# Patient Record
Sex: FEMALE | Race: WHITE | NOT HISPANIC OR LATINO | URBAN - METROPOLITAN AREA
[De-identification: names, ages, dates, MRNs, and addresses within clinical notes are randomized per-mention and may not be internally consistent; named-entity substitution may affect disease eponyms.]

---

## 2019-01-26 ENCOUNTER — OUTPATIENT (OUTPATIENT)
Dept: OUTPATIENT SERVICES | Facility: HOSPITAL | Age: 84
LOS: 1 days | Discharge: HOME | End: 2019-01-26

## 2019-01-26 DIAGNOSIS — E03.9 HYPOTHYROIDISM, UNSPECIFIED: ICD-10-CM

## 2019-01-26 DIAGNOSIS — R53.83 OTHER FATIGUE: ICD-10-CM

## 2019-01-26 DIAGNOSIS — E78.5 HYPERLIPIDEMIA, UNSPECIFIED: ICD-10-CM

## 2019-01-26 DIAGNOSIS — K76.9 LIVER DISEASE, UNSPECIFIED: ICD-10-CM

## 2019-01-26 DIAGNOSIS — I10 ESSENTIAL (PRIMARY) HYPERTENSION: ICD-10-CM

## 2019-01-26 DIAGNOSIS — N39.0 URINARY TRACT INFECTION, SITE NOT SPECIFIED: ICD-10-CM

## 2019-01-26 DIAGNOSIS — E13.9 OTHER SPECIFIED DIABETES MELLITUS WITHOUT COMPLICATIONS: ICD-10-CM

## 2019-01-26 DIAGNOSIS — E55.9 VITAMIN D DEFICIENCY, UNSPECIFIED: ICD-10-CM

## 2019-03-06 ENCOUNTER — EMERGENCY (EMERGENCY)
Facility: HOSPITAL | Age: 84
LOS: 0 days | Discharge: HOME | End: 2019-03-06
Attending: EMERGENCY MEDICINE | Admitting: EMERGENCY MEDICINE

## 2019-03-06 VITALS
HEART RATE: 56 BPM | OXYGEN SATURATION: 98 % | SYSTOLIC BLOOD PRESSURE: 133 MMHG | RESPIRATION RATE: 17 BRPM | DIASTOLIC BLOOD PRESSURE: 65 MMHG

## 2019-03-06 VITALS
HEART RATE: 49 BPM | SYSTOLIC BLOOD PRESSURE: 136 MMHG | WEIGHT: 106.92 LBS | DIASTOLIC BLOOD PRESSURE: 67 MMHG | OXYGEN SATURATION: 97 % | TEMPERATURE: 96 F | RESPIRATION RATE: 18 BRPM

## 2019-03-06 DIAGNOSIS — Z98.890 OTHER SPECIFIED POSTPROCEDURAL STATES: ICD-10-CM

## 2019-03-06 DIAGNOSIS — I10 ESSENTIAL (PRIMARY) HYPERTENSION: ICD-10-CM

## 2019-03-06 DIAGNOSIS — R06.02 SHORTNESS OF BREATH: ICD-10-CM

## 2019-03-06 DIAGNOSIS — Z79.899 OTHER LONG TERM (CURRENT) DRUG THERAPY: ICD-10-CM

## 2019-03-06 DIAGNOSIS — Z98.890 OTHER SPECIFIED POSTPROCEDURAL STATES: Chronic | ICD-10-CM
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
## 2019-03-06 DIAGNOSIS — R42 DIZZINESS AND GIDDINESS: ICD-10-CM
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
## 2019-03-06 DIAGNOSIS — R07.81 PLEURODYNIA: ICD-10-CM
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
## 2019-03-06 DIAGNOSIS — E11.9 TYPE 2 DIABETES MELLITUS WITHOUT COMPLICATIONS: ICD-10-CM

## 2019-03-06 RX ORDER — ACETAMINOPHEN 500 MG
650 TABLET ORAL ONCE
Qty: 0 | Refills: 0 | Status: COMPLETED | OUTPATIENT
Start: 2019-03-06 | End: 2019-03-06

## 2019-03-06 RX ADMIN — Medication 650 MILLIGRAM(S): at 14:32

## 2019-03-06 NOTE — ED PROVIDER NOTE - NSFOLLOWUPINSTRUCTIONS_ED_ALL_ED_FT
Contusion    A contusion is a deep bruise. Contusions are the result of a blunt injury to tissues and muscle fibers under the skin. The skin overlying the contusion may turn blue, purple, or yellow. Symptoms also include pain and swelling in the injured area. Symptoms should resolve with time.     SEEK IMMEDIATE MEDICAL CARE IF YOU HAVE THE FOLLOWING SYMPTOMS: severe pain, numbness, tingling, pain, weakness, or skin color/temperature change in any part of your body distal to the fracture.    Patient to be discharged from ED. Any available test results were discussed with patient and/or family. Verbal instructions given, including instructions to return to ED immediately for any new, worsening, or concerning symptoms. Patient endorsed understanding. Written discharge instructions additionally given, including follow-up plan.

## 2019-03-06 NOTE — ED PROVIDER NOTE - OBJECTIVE STATEMENT
hx from patient and daughter  89 yo F c/o right  ribs pains x 3 days. Per daughter patient was choking on wine Monday night so she did the Heimlich maneuver on her mom. Since then patient has had right rib pains with movement and inspiration. Pain is intermittent and moderate in severity.

## 2019-03-06 NOTE — ED PROVIDER NOTE - NS ED ROS FT
Review of Systems    Constitutional: (-) fever, (-) chills  Cardiovascular: (-) chest pain, (-) syncope  Respiratory: (-) cough, (-) shortness of breath  Gastrointestinal: (-) pain, (-) nausea, (-) vomiting, (-) diarrhea  Musculoskeletal: (-) neck pain, (-) back pain, (+) right rib pain  Integumentary: (-) rash, (-) edema  Neurological: (-) headache, (-) altered mental status

## 2019-03-06 NOTE — ED ADULT NURSE NOTE - NSIMPLEMENTINTERV_GEN_ALL_ED
Implemented All Universal Safety Interventions:  [place] to call system. Call bell, personal items and telephone within reach. Instruct patient to call for assistance. Room bathroom lighting operational. Non-slip footwear when patient is off stretcher. Physically safe environment: no spills, clutter or unnecessary equipment. Stretcher in lowest position, wheels locked, appropriate side rails in place.

## 2019-03-06 NOTE — ED ADULT TRIAGE NOTE - CHIEF COMPLAINT QUOTE
daughter states choking on wine last night so I gave Heimlich and im afraid I broke a rib she is short of breath and has history of pneumothorax".

## 2019-03-06 NOTE — ED PROVIDER NOTE - ATTENDING CONTRIBUTION TO CARE
89yo F presents with SOB and dizziness that began on Monday. pt states that on Monday she choked and was given the Heimlich Maneuver but now is also c/o pain to the right ribs. Pt denies any CP, nausea, vomiting, abdominal pain, back pain. On exam: NCAT. PERRLA, EOMI. OP clear. Lungs CTAB. RRR, S1S2 noted. Radial pulses 2+ and equal, pedal pulses 2+ and equal. Abdomen soft, NT/ND, no rebound or guarding. FROM x4 extremities. No focal neuro deficits. Plan: CT, CXR and reassess

## 2019-03-06 NOTE — ED PROVIDER NOTE - PROGRESS NOTE DETAILS
89yo F presents with SOB and dizziness that began on Monday. pt states that on Monday she choked and was given the Heimlich Maneuver but now is also c/o pain to the right ribs. Pt denies any CP, nausea, vomiting, abdominal pain, back pain. On exam: NCAT. PERRLA, EOMI. OP clear. Lungs CTAB. RRR, S1S2 noted. Radial pulses 2+ and equal, pedal pulses 2+ and equal. Abdomen soft, NT/ND, no rebound or guarding. FROM x4 extremities. No focal neuro deficits. Plan: CT, CXR and reassess Patient given copies of results, advised f/u with pmd.

## 2019-03-06 NOTE — ED ADULT NURSE NOTE - PMH
Diverticulosis    DM (diabetes mellitus)    Glaucoma    HTN (hypertension)    Macular degeneration, age related    Pneumothorax

## 2019-03-06 NOTE — ED PROVIDER NOTE - PHYSICAL EXAMINATION
Gen: Alert, NAD, well appearing  Head: NC, AT, PERRL, EOMI, normal lids/conjunctiva  Neck: +supple, no tenderness/meningismus,  Pulm: Bilateral BS, normal resp effort, no wheeze/stridor/retractions  CV: S1S2, aleksandra  Abd: soft, NT/ND  Mskel: + tender to palpation right  lower lateral ribs. No swelling, ecchymosis, or crepitus. No edema/erythema/cyanosis  Skin: no rash, warm/dry  Neuro: AAOx3, no sensory/motor deficits

## 2019-04-20 ENCOUNTER — INPATIENT (INPATIENT)
Facility: HOSPITAL | Age: 84
LOS: 2 days | Discharge: HOME HEALTH | End: 2019-04-23
Attending: INTERNAL MEDICINE | Admitting: INTERNAL MEDICINE
Payer: MEDICARE

## 2019-04-20 VITALS
DIASTOLIC BLOOD PRESSURE: 63 MMHG | SYSTOLIC BLOOD PRESSURE: 145 MMHG | TEMPERATURE: 98 F | RESPIRATION RATE: 22 BRPM | HEART RATE: 66 BPM | OXYGEN SATURATION: 96 %

## 2019-04-20 DIAGNOSIS — Z98.890 OTHER SPECIFIED POSTPROCEDURAL STATES: Chronic | ICD-10-CM

## 2019-04-20 PROBLEM — H40.9 UNSPECIFIED GLAUCOMA: Chronic | Status: ACTIVE | Noted: 2019-03-06

## 2019-04-20 PROBLEM — E11.9 TYPE 2 DIABETES MELLITUS WITHOUT COMPLICATIONS: Chronic | Status: ACTIVE | Noted: 2019-03-06

## 2019-04-20 PROBLEM — H35.30 UNSPECIFIED MACULAR DEGENERATION: Chronic | Status: ACTIVE | Noted: 2019-03-06

## 2019-04-20 PROBLEM — I10 ESSENTIAL (PRIMARY) HYPERTENSION: Chronic | Status: ACTIVE | Noted: 2019-03-06

## 2019-04-20 PROBLEM — K57.90 DIVERTICULOSIS OF INTESTINE, PART UNSPECIFIED, WITHOUT PERFORATION OR ABSCESS WITHOUT BLEEDING: Chronic | Status: ACTIVE | Noted: 2019-03-06

## 2019-04-20 PROBLEM — J93.9 PNEUMOTHORAX, UNSPECIFIED: Chronic | Status: ACTIVE | Noted: 2019-03-06

## 2019-04-20 LAB
ALBUMIN SERPL ELPH-MCNC: 3.8 G/DL — SIGNIFICANT CHANGE UP (ref 3.5–5.2)
ALP SERPL-CCNC: 266 U/L — HIGH (ref 30–115)
ALT FLD-CCNC: 26 U/L — SIGNIFICANT CHANGE UP (ref 0–41)
ANION GAP SERPL CALC-SCNC: 13 MMOL/L — SIGNIFICANT CHANGE UP (ref 7–14)
AST SERPL-CCNC: 24 U/L — SIGNIFICANT CHANGE UP (ref 0–41)
BASOPHILS # BLD AUTO: 0.06 K/UL — SIGNIFICANT CHANGE UP (ref 0–0.2)
BASOPHILS NFR BLD AUTO: 0.4 % — SIGNIFICANT CHANGE UP (ref 0–1)
BILIRUB SERPL-MCNC: 0.9 MG/DL — SIGNIFICANT CHANGE UP (ref 0.2–1.2)
BUN SERPL-MCNC: 16 MG/DL — SIGNIFICANT CHANGE UP (ref 10–20)
CALCIUM SERPL-MCNC: 9.2 MG/DL — SIGNIFICANT CHANGE UP (ref 8.5–10.1)
CHLORIDE SERPL-SCNC: 101 MMOL/L — SIGNIFICANT CHANGE UP (ref 98–110)
CO2 SERPL-SCNC: 25 MMOL/L — SIGNIFICANT CHANGE UP (ref 17–32)
CREAT SERPL-MCNC: 1 MG/DL — SIGNIFICANT CHANGE UP (ref 0.7–1.5)
EOSINOPHIL # BLD AUTO: 0.12 K/UL — SIGNIFICANT CHANGE UP (ref 0–0.7)
EOSINOPHIL NFR BLD AUTO: 0.8 % — SIGNIFICANT CHANGE UP (ref 0–8)
GLUCOSE BLDC GLUCOMTR-MCNC: 102 MG/DL — HIGH (ref 70–99)
GLUCOSE BLDC GLUCOMTR-MCNC: 131 MG/DL — HIGH (ref 70–99)
GLUCOSE BLDC GLUCOMTR-MCNC: 181 MG/DL — HIGH (ref 70–99)
GLUCOSE BLDC GLUCOMTR-MCNC: 26 MG/DL — CRITICAL LOW (ref 70–99)
GLUCOSE BLDC GLUCOMTR-MCNC: 279 MG/DL — HIGH (ref 70–99)
GLUCOSE BLDC GLUCOMTR-MCNC: 285 MG/DL — HIGH (ref 70–99)
GLUCOSE BLDC GLUCOMTR-MCNC: 31 MG/DL — CRITICAL LOW (ref 70–99)
GLUCOSE BLDC GLUCOMTR-MCNC: 87 MG/DL — SIGNIFICANT CHANGE UP (ref 70–99)
GLUCOSE SERPL-MCNC: 256 MG/DL — HIGH (ref 70–99)
HCT VFR BLD CALC: 43.5 % — SIGNIFICANT CHANGE UP (ref 37–47)
HGB BLD-MCNC: 14.2 G/DL — SIGNIFICANT CHANGE UP (ref 12–16)
IMM GRANULOCYTES NFR BLD AUTO: 0.7 % — HIGH (ref 0.1–0.3)
LACTATE SERPL-SCNC: 1 MMOL/L — SIGNIFICANT CHANGE UP (ref 0.5–2.2)
LYMPHOCYTES # BLD AUTO: 1.03 K/UL — LOW (ref 1.2–3.4)
LYMPHOCYTES # BLD AUTO: 7.3 % — LOW (ref 20.5–51.1)
MCHC RBC-ENTMCNC: 28.7 PG — SIGNIFICANT CHANGE UP (ref 27–31)
MCHC RBC-ENTMCNC: 32.6 G/DL — SIGNIFICANT CHANGE UP (ref 32–37)
MCV RBC AUTO: 88.1 FL — SIGNIFICANT CHANGE UP (ref 81–99)
MONOCYTES # BLD AUTO: 0.5 K/UL — SIGNIFICANT CHANGE UP (ref 0.1–0.6)
MONOCYTES NFR BLD AUTO: 3.5 % — SIGNIFICANT CHANGE UP (ref 1.7–9.3)
NEUTROPHILS # BLD AUTO: 12.31 K/UL — HIGH (ref 1.4–6.5)
NEUTROPHILS NFR BLD AUTO: 87.3 % — HIGH (ref 42.2–75.2)
NRBC # BLD: 0 /100 WBCS — SIGNIFICANT CHANGE UP (ref 0–0)
NT-PROBNP SERPL-SCNC: 1079 PG/ML — HIGH (ref 0–300)
PLATELET # BLD AUTO: 261 K/UL — SIGNIFICANT CHANGE UP (ref 130–400)
POTASSIUM SERPL-MCNC: 4.1 MMOL/L — SIGNIFICANT CHANGE UP (ref 3.5–5)
POTASSIUM SERPL-SCNC: 4.1 MMOL/L — SIGNIFICANT CHANGE UP (ref 3.5–5)
PROT SERPL-MCNC: 7.4 G/DL — SIGNIFICANT CHANGE UP (ref 6–8)
RBC # BLD: 4.94 M/UL — SIGNIFICANT CHANGE UP (ref 4.2–5.4)
RBC # FLD: 14.9 % — HIGH (ref 11.5–14.5)
SODIUM SERPL-SCNC: 139 MMOL/L — SIGNIFICANT CHANGE UP (ref 135–146)
TROPONIN T SERPL-MCNC: <0.01 NG/ML — SIGNIFICANT CHANGE UP
WBC # BLD: 14.12 K/UL — HIGH (ref 4.8–10.8)
WBC # FLD AUTO: 14.12 K/UL — HIGH (ref 4.8–10.8)

## 2019-04-20 PROCEDURE — 71045 X-RAY EXAM CHEST 1 VIEW: CPT | Mod: 26

## 2019-04-20 PROCEDURE — 99285 EMERGENCY DEPT VISIT HI MDM: CPT

## 2019-04-20 PROCEDURE — 93010 ELECTROCARDIOGRAM REPORT: CPT

## 2019-04-20 RX ORDER — DEXTROSE 50 % IN WATER 50 %
25 SYRINGE (ML) INTRAVENOUS ONCE
Qty: 0 | Refills: 0 | Status: COMPLETED | OUTPATIENT
Start: 2019-04-20 | End: 2019-04-20

## 2019-04-20 RX ORDER — FUROSEMIDE 40 MG
40 TABLET ORAL DAILY
Qty: 0 | Refills: 0 | Status: DISCONTINUED | OUTPATIENT
Start: 2019-04-20 | End: 2019-04-23

## 2019-04-20 RX ORDER — LISINOPRIL 2.5 MG/1
10 TABLET ORAL DAILY
Qty: 0 | Refills: 0 | Status: DISCONTINUED | OUTPATIENT
Start: 2019-04-20 | End: 2019-04-23

## 2019-04-20 RX ORDER — SODIUM CHLORIDE 9 MG/ML
1000 INJECTION, SOLUTION INTRAVENOUS
Qty: 0 | Refills: 0 | Status: DISCONTINUED | OUTPATIENT
Start: 2019-04-20 | End: 2019-04-23

## 2019-04-20 RX ORDER — DEXTROSE 50 % IN WATER 50 %
12.5 SYRINGE (ML) INTRAVENOUS ONCE
Qty: 0 | Refills: 0 | Status: DISCONTINUED | OUTPATIENT
Start: 2019-04-20 | End: 2019-04-23

## 2019-04-20 RX ORDER — DEXTROSE 50 % IN WATER 50 %
25 SYRINGE (ML) INTRAVENOUS ONCE
Qty: 0 | Refills: 0 | Status: DISCONTINUED | OUTPATIENT
Start: 2019-04-20 | End: 2019-04-23

## 2019-04-20 RX ORDER — POTASSIUM CHLORIDE 20 MEQ
20 PACKET (EA) ORAL DAILY
Qty: 0 | Refills: 0 | Status: DISCONTINUED | OUTPATIENT
Start: 2019-04-20 | End: 2019-04-23

## 2019-04-20 RX ORDER — INSULIN LISPRO 100/ML
VIAL (ML) SUBCUTANEOUS
Qty: 0 | Refills: 0 | Status: DISCONTINUED | OUTPATIENT
Start: 2019-04-20 | End: 2019-04-20

## 2019-04-20 RX ORDER — CHLORHEXIDINE GLUCONATE 213 G/1000ML
1 SOLUTION TOPICAL
Qty: 0 | Refills: 0 | Status: DISCONTINUED | OUTPATIENT
Start: 2019-04-20 | End: 2019-04-23

## 2019-04-20 RX ORDER — AMLODIPINE BESYLATE 2.5 MG/1
5 TABLET ORAL DAILY
Qty: 0 | Refills: 0 | Status: DISCONTINUED | OUTPATIENT
Start: 2019-04-20 | End: 2019-04-23

## 2019-04-20 RX ORDER — PROPRANOLOL HCL 160 MG
40 CAPSULE, EXTENDED RELEASE 24HR ORAL
Qty: 0 | Refills: 0 | Status: DISCONTINUED | OUTPATIENT
Start: 2019-04-20 | End: 2019-04-22

## 2019-04-20 RX ORDER — INSULIN GLARGINE 100 [IU]/ML
15 INJECTION, SOLUTION SUBCUTANEOUS EVERY MORNING
Qty: 0 | Refills: 0 | Status: DISCONTINUED | OUTPATIENT
Start: 2019-04-20 | End: 2019-04-20

## 2019-04-20 RX ORDER — ENOXAPARIN SODIUM 100 MG/ML
40 INJECTION SUBCUTANEOUS AT BEDTIME
Qty: 0 | Refills: 0 | Status: DISCONTINUED | OUTPATIENT
Start: 2019-04-20 | End: 2019-04-23

## 2019-04-20 RX ORDER — GLUCAGON INJECTION, SOLUTION 0.5 MG/.1ML
1 INJECTION, SOLUTION SUBCUTANEOUS ONCE
Qty: 0 | Refills: 0 | Status: DISCONTINUED | OUTPATIENT
Start: 2019-04-20 | End: 2019-04-23

## 2019-04-20 RX ORDER — INSULIN LISPRO 100/ML
5 VIAL (ML) SUBCUTANEOUS
Qty: 0 | Refills: 0 | Status: DISCONTINUED | OUTPATIENT
Start: 2019-04-20 | End: 2019-04-20

## 2019-04-20 RX ORDER — DEXTROSE 50 % IN WATER 50 %
15 SYRINGE (ML) INTRAVENOUS ONCE
Qty: 0 | Refills: 0 | Status: DISCONTINUED | OUTPATIENT
Start: 2019-04-20 | End: 2019-04-23

## 2019-04-20 RX ADMIN — Medication 5 UNIT(S): at 17:11

## 2019-04-20 RX ADMIN — Medication 25 GRAM(S): at 19:15

## 2019-04-20 RX ADMIN — ENOXAPARIN SODIUM 40 MILLIGRAM(S): 100 INJECTION SUBCUTANEOUS at 21:07

## 2019-04-20 RX ADMIN — INSULIN GLARGINE 15 UNIT(S): 100 INJECTION, SOLUTION SUBCUTANEOUS at 13:08

## 2019-04-20 RX ADMIN — Medication 40 MILLIGRAM(S): at 17:11

## 2019-04-20 RX ADMIN — Medication 6: at 17:11

## 2019-04-20 NOTE — H&P ADULT - ATTENDING COMMENTS
The patient was seen and examined at bedside independently.  Agree with above a/p by resident except the portion edited/deleted.    Would d0 d/c planning in 24/48 hrs,

## 2019-04-20 NOTE — H&P ADULT - ASSESSMENT
89 y.o. f w/ PMHx of DM, Hypothyroidism, DLD presents with a chief complaint of shortness of breath that has been progressively worsening for the past month.     #) BHATIA  -doubt CHF  -possible COPD/ Asthma, pulm HTN?  -BNP- 1079, but w/ age expect BNP>1800 and CXR does not reflect volume overload  -2D Echo ordered  -c/w home dose lasix  -CE neg x1  -CXR in am ordered  -EKG in am ordered    #) Hypothyroidism  -c/w home meds    #) DLD  -c/w home meds    #) DM  -basal/ preprandial insulin regimen started    #) DVT/ GI ppx  -lovenox, no GI ppx indicated    #) Code Status  -full code    #) Dispo  -from home  -Physiatry/ PT eval pending 89 y.o. f w/ PMHx of DM, Hypothyroidism, DLD presents with a chief complaint of shortness of breath that has been progressively worsening for the past month.     #) BHATIA  -doubt CHF  -possible COPD/ Asthma, pulm HTN?  -BNP- 1079, but w/ age expect BNP>1800 and CXR does not reflect volume overload  -2D Echo ordered  -c/w home dose lasix  -CE neg x1  -CXR in am ordered  -EKG in am ordered    #) Hypothyroidism  -c/w home meds    #) DLD  -c/w home meds    #) DM  -basal/ preprandial insulin regimen started    #) DVT/ GI ppx  -lovenox, no GI ppx indicated    #) Code Status  -DNR/ DNI    #) Dispo  -from home  -Physiatry/ PT eval pending 89 y.o. f w/ PMHx of DM, HTN, OA presents with a chief complaint of shortness of breath on exertion that has been progressively worsening for the past week.    #) BHATIA of unknown origin  -doubt CHF  -possible Asthma (never smoker), pulm HTN?  -BNP- 1079, but w/ age expect BNP>1800 and CXR does not reflect volume overload  -2D Echo ordered  -c/w home dose lasix  -CE neg x1  -CXR in am ordered  -EKG in am ordered    #) HTN  -c/w home meds (amlodipine/ benazipril)    #) DM  -basal/ preprandial insulin regimen started    #) DVT/ GI ppx  -lovenox, no GI ppx indicated    #) Code Status  -DNR/ DNI    #) Dispo  -from home, lives w/ daughter, ambulates independently  -Physiatry/ PT eval pending 89 y.o. f w/ PMHx of DM, HTN, OA presents with a chief complaint of shortness of breath on exertion that has been progressively worsening for the past week.    #) BHATIA of unknown origin  -doubt CHF  -possible Asthma (never smoker), pulm HTN?  -BNP- 1079, but w/ age expect BNP>1800 and CXR does not reflect volume overload  -2D Echo  r/o valvular dz.  -c/w home dose lasix  -CE neg x1  -CXR >>NAPD  -EKG     #) HTN  -c/w home meds (amlodipine/ benazipril)    #) DM  -basal/ preprandial insulin regimen started    #) DVT/ GI ppx  -lovenox, no GI ppx indicated    #) Code Status  -DNR/ DNI    #) Dispo  -from home, lives w/ daughter, ambulates independently  -Physiatry/ PT eval pending

## 2019-04-20 NOTE — ED ADULT NURSE NOTE - NSIMPLEMENTINTERV_GEN_ALL_ED
Implemented All Fall with Harm Risk Interventions:  [place] to call system. Call bell, personal items and telephone within reach. Instruct patient to call for assistance. Room bathroom lighting operational. Non-slip footwear when patient is off stretcher. Physically safe environment: no spills, clutter or unnecessary equipment. Stretcher in lowest position, wheels locked, appropriate side rails in place. Provide visual cue, wrist band, yellow gown, etc. Monitor gait and stability. Monitor for mental status changes and reorient to person, place, and time. Review medications for side effects contributing to fall risk. Reinforce activity limits and safety measures with patient and family. Provide visual clues: red socks.

## 2019-04-20 NOTE — H&P ADULT - NSICDXPASTMEDICALHX_GEN_ALL_CORE_FT
PAST MEDICAL HISTORY:  Diverticulosis     DM (diabetes mellitus)     Glaucoma     HTN (hypertension)     Macular degeneration, age related     Pneumothorax

## 2019-04-20 NOTE — ED PROVIDER NOTE - CLINICAL SUMMARY MEDICAL DECISION MAKING FREE TEXT BOX
Pt w/ SOB. + elevated BNP. Family concerned b/c pt cannot ambulate well in house w/ SOB. Fall risk. No cardiologist. Will admit for SOB, concern for fluid overload state, for echo, specialist consult, close reassessment for response to tx.

## 2019-04-20 NOTE — H&P ADULT - NSHPLABSRESULTS_GEN_ALL_CORE
14.2   14.12 )-----------( 261      ( 20 Apr 2019 08:39 )             43.5       04-20    139  |  101  |  16  ----------------------------<  256<H>  4.1   |  25  |  1.0    Ca    9.2      20 Apr 2019 08:39    TPro  7.4  /  Alb  3.8  /  TBili  0.9  /  DBili  x   /  AST  24  /  ALT  26  /  AlkPhos  266<H>  04-20      LIVER FUNCTIONS - ( 20 Apr 2019 08:39 )  Alb: 3.8 g/dL / Pro: 7.4 g/dL / ALK PHOS: 266 U/L / ALT: 26 U/L / AST: 24 U/L / GGT: x                 CARDIAC MARKERS ( 20 Apr 2019 08:39 )  x     / <0.01 ng/mL / x     / x     / x

## 2019-04-20 NOTE — ED ADULT NURSE NOTE - OBJECTIVE STATEMENT
Pt c/o worsening shortness of breath with exertion over the past week. Denies fevers, chills, chest pain, nausea, vomiting, cough, abd pain, back pain, or urinary symptoms.

## 2019-04-20 NOTE — ED PROVIDER NOTE - PHYSICAL EXAMINATION
CONSTITUTIONAL: NAD  SKIN: Warm dry  HEAD: NCAT  EYES: NL inspection  ENT: MMM  NECK: Supple; non tender.  CARD: RRR  RESP: fine bibasilar rales, some sob w/ lying flat  ABD: S/NT no R/G  EXT: 1+ b/l pitting edema  NEURO: Grossly unremarkable  PSYCH: Cooperative, appropriate.

## 2019-04-20 NOTE — ED PROVIDER NOTE - NS ED ROS FT
Constitutional:  No fever  Eyes:  No visual changes  ENMT: No neck pain or stiffness  Cardiac:  No chest pain  Respiratory:  See HPI  GI:  No nausea, vomiting, diarrhea or abdominal pain.  :  No dysuria, frequency or burning.  MS:  No back pain.  Neuro:  No headache   Skin:  No skin rash  Except as documented in the HPI,  all other systems are negative

## 2019-04-20 NOTE — ED ADULT TRIAGE NOTE - CHIEF COMPLAINT QUOTE
patient Shortness of breath x 1 month. as per ems patient was 87% on RA with tachypnea patient arrived to ed reports improvement.  lung sounds noted to have rhonchi. trace edema to b/l lower extremities. no fever no cough

## 2019-04-20 NOTE — ED PROVIDER NOTE - OBJECTIVE STATEMENT
89 y/o F pmh DM, Hlipid, thyroid d/o, p/w progressive, gradual onset sob x 1 mo. Sx worse w/ lying flat, and now also w/ even short ambulation, better at rest. + leg swelling. No med or diet changes. No fever, cp, travel. 89 y/o F pmh DM, Hlipid, thyroid d/o, p/w progressive, gradual onset sob x 1 mo. Sx worse w/ lying flat, and now also w/ even short ambulation, better at rest. + leg swelling. No med or diet changes. No fever, cp, travel. Pt is DNR/ DNI

## 2019-04-20 NOTE — H&P ADULT - HISTORY OF PRESENT ILLNESS
89 y.o. f w/ PMHx of DM, Hypothyroidism, DLD presents with a chief complaint of shortness of breath that has been progressively worsening for the past month. Patient is on lasix at home but has no underlying cardiac history. Symptoms are exacerbated by lying flat and exerting herself. Symptoms are better when she sits up. Patient denies any changes in her medications. Patient denies having COPD or asthma. Denies any fevers, chills, or night sweats. Patient denies any recent travel or any change in here diet. Patient denies any chest pain or palpitations. 89 y.o. f w/ PMHx of DM, HTN, OA presents with a chief complaint of shortness of breath on exertion that has been progressively worsening for the past week. Patient is on lasix at home but has no underlying cardiac history. Symptoms are exacerbated by lying flat and exerting herself. Symptoms are better when she sits up. Patient denies any changes in her medications. Patient denies having COPD or asthma. Denies any fevers, chills, or night sweats. Patient denies any recent travel or any change in here diet. Patient denies any chest pain or palpitations.    PMD- Dr. Gill Mcdonough    Pharm- [facility] on [address]

## 2019-04-20 NOTE — PATIENT PROFILE ADULT - ABILITY TO HEAR (WITH HEARING AID OR HEARING APPLIANCE IF NORMALLY USED):
Mildly to Moderately Impaired: difficulty hearing in some environments or speaker may need to increase volume or speak distinctly/pt does not have hearing aids

## 2019-04-20 NOTE — H&P ADULT - NSHPPHYSICALEXAM_GEN_ALL_CORE
CONSTITUTIONAL: NAD  SKIN: Warm dry  HEAD: NCAT  EYES: NL inspection  ENT: MMM  NECK: Supple; non tender.  CARD: RRR  RESP: sob w/ lying flat  ABD: S/NT no R/G  EXT: 1+ b/l pitting edema  NEURO: Grossly unremarkable  PSYCH: Cooperative, appropriate.

## 2019-04-21 LAB
ALBUMIN SERPL ELPH-MCNC: 3.4 G/DL — LOW (ref 3.5–5.2)
ALP SERPL-CCNC: 228 U/L — HIGH (ref 30–115)
ALT FLD-CCNC: 23 U/L — SIGNIFICANT CHANGE UP (ref 0–41)
ANION GAP SERPL CALC-SCNC: 11 MMOL/L — SIGNIFICANT CHANGE UP (ref 7–14)
AST SERPL-CCNC: 25 U/L — SIGNIFICANT CHANGE UP (ref 0–41)
BASOPHILS # BLD AUTO: 0.05 K/UL — SIGNIFICANT CHANGE UP (ref 0–0.2)
BASOPHILS NFR BLD AUTO: 0.4 % — SIGNIFICANT CHANGE UP (ref 0–1)
BILIRUB SERPL-MCNC: 0.5 MG/DL — SIGNIFICANT CHANGE UP (ref 0.2–1.2)
BUN SERPL-MCNC: 15 MG/DL — SIGNIFICANT CHANGE UP (ref 10–20)
CALCIUM SERPL-MCNC: 8.8 MG/DL — SIGNIFICANT CHANGE UP (ref 8.5–10.1)
CHLORIDE SERPL-SCNC: 103 MMOL/L — SIGNIFICANT CHANGE UP (ref 98–110)
CO2 SERPL-SCNC: 28 MMOL/L — SIGNIFICANT CHANGE UP (ref 17–32)
CREAT SERPL-MCNC: 0.8 MG/DL — SIGNIFICANT CHANGE UP (ref 0.7–1.5)
EOSINOPHIL # BLD AUTO: 0.16 K/UL — SIGNIFICANT CHANGE UP (ref 0–0.7)
EOSINOPHIL NFR BLD AUTO: 1.3 % — SIGNIFICANT CHANGE UP (ref 0–8)
GLUCOSE BLDC GLUCOMTR-MCNC: 109 MG/DL — HIGH (ref 70–99)
GLUCOSE BLDC GLUCOMTR-MCNC: 113 MG/DL — HIGH (ref 70–99)
GLUCOSE BLDC GLUCOMTR-MCNC: 119 MG/DL — HIGH (ref 70–99)
GLUCOSE BLDC GLUCOMTR-MCNC: 190 MG/DL — HIGH (ref 70–99)
GLUCOSE BLDC GLUCOMTR-MCNC: 275 MG/DL — HIGH (ref 70–99)
GLUCOSE BLDC GLUCOMTR-MCNC: 304 MG/DL — HIGH (ref 70–99)
GLUCOSE BLDC GLUCOMTR-MCNC: 313 MG/DL — HIGH (ref 70–99)
GLUCOSE BLDC GLUCOMTR-MCNC: 62 MG/DL — LOW (ref 70–99)
GLUCOSE SERPL-MCNC: 58 MG/DL — LOW (ref 70–99)
HCT VFR BLD CALC: 42.5 % — SIGNIFICANT CHANGE UP (ref 37–47)
HGB BLD-MCNC: 13.5 G/DL — SIGNIFICANT CHANGE UP (ref 12–16)
IMM GRANULOCYTES NFR BLD AUTO: 0.5 % — HIGH (ref 0.1–0.3)
LACTATE SERPL-SCNC: 0.6 MMOL/L — SIGNIFICANT CHANGE UP (ref 0.5–2.2)
LYMPHOCYTES # BLD AUTO: 17.9 % — LOW (ref 20.5–51.1)
LYMPHOCYTES # BLD AUTO: 2.18 K/UL — SIGNIFICANT CHANGE UP (ref 1.2–3.4)
MAGNESIUM SERPL-MCNC: 1.8 MG/DL — SIGNIFICANT CHANGE UP (ref 1.8–2.4)
MCHC RBC-ENTMCNC: 28.1 PG — SIGNIFICANT CHANGE UP (ref 27–31)
MCHC RBC-ENTMCNC: 31.8 G/DL — LOW (ref 32–37)
MCV RBC AUTO: 88.4 FL — SIGNIFICANT CHANGE UP (ref 81–99)
MONOCYTES # BLD AUTO: 0.72 K/UL — HIGH (ref 0.1–0.6)
MONOCYTES NFR BLD AUTO: 5.9 % — SIGNIFICANT CHANGE UP (ref 1.7–9.3)
NEUTROPHILS # BLD AUTO: 9.04 K/UL — HIGH (ref 1.4–6.5)
NEUTROPHILS NFR BLD AUTO: 74 % — SIGNIFICANT CHANGE UP (ref 42.2–75.2)
NRBC # BLD: 0 /100 WBCS — SIGNIFICANT CHANGE UP (ref 0–0)
PLATELET # BLD AUTO: 241 K/UL — SIGNIFICANT CHANGE UP (ref 130–400)
POTASSIUM SERPL-MCNC: 3.9 MMOL/L — SIGNIFICANT CHANGE UP (ref 3.5–5)
POTASSIUM SERPL-SCNC: 3.9 MMOL/L — SIGNIFICANT CHANGE UP (ref 3.5–5)
PROT SERPL-MCNC: 6.6 G/DL — SIGNIFICANT CHANGE UP (ref 6–8)
RBC # BLD: 4.81 M/UL — SIGNIFICANT CHANGE UP (ref 4.2–5.4)
RBC # FLD: 14.7 % — HIGH (ref 11.5–14.5)
SODIUM SERPL-SCNC: 142 MMOL/L — SIGNIFICANT CHANGE UP (ref 135–146)
TROPONIN T SERPL-MCNC: <0.01 NG/ML — SIGNIFICANT CHANGE UP
WBC # BLD: 12.21 K/UL — HIGH (ref 4.8–10.8)
WBC # FLD AUTO: 12.21 K/UL — HIGH (ref 4.8–10.8)

## 2019-04-21 PROCEDURE — 71045 X-RAY EXAM CHEST 1 VIEW: CPT | Mod: 26

## 2019-04-21 RX ADMIN — Medication 40 MILLIGRAM(S): at 05:39

## 2019-04-21 RX ADMIN — LISINOPRIL 10 MILLIGRAM(S): 2.5 TABLET ORAL at 05:39

## 2019-04-21 RX ADMIN — ENOXAPARIN SODIUM 40 MILLIGRAM(S): 100 INJECTION SUBCUTANEOUS at 21:20

## 2019-04-21 RX ADMIN — Medication 20 MILLIEQUIVALENT(S): at 11:09

## 2019-04-21 RX ADMIN — Medication 20 MILLIGRAM(S): at 11:09

## 2019-04-21 RX ADMIN — AMLODIPINE BESYLATE 5 MILLIGRAM(S): 2.5 TABLET ORAL at 05:39

## 2019-04-21 RX ADMIN — Medication 40 MILLIGRAM(S): at 17:33

## 2019-04-22 ENCOUNTER — TRANSCRIPTION ENCOUNTER (OUTPATIENT)
Age: 84
End: 2019-04-22

## 2019-04-22 LAB
ALBUMIN SERPL ELPH-MCNC: 3.3 G/DL — LOW (ref 3.5–5.2)
ALP SERPL-CCNC: 208 U/L — HIGH (ref 30–115)
ALT FLD-CCNC: 21 U/L — SIGNIFICANT CHANGE UP (ref 0–41)
ANION GAP SERPL CALC-SCNC: 9 MMOL/L — SIGNIFICANT CHANGE UP (ref 7–14)
AST SERPL-CCNC: 20 U/L — SIGNIFICANT CHANGE UP (ref 0–41)
BASOPHILS # BLD AUTO: 0.05 K/UL — SIGNIFICANT CHANGE UP (ref 0–0.2)
BASOPHILS NFR BLD AUTO: 0.6 % — SIGNIFICANT CHANGE UP (ref 0–1)
BILIRUB SERPL-MCNC: 0.6 MG/DL — SIGNIFICANT CHANGE UP (ref 0.2–1.2)
BUN SERPL-MCNC: 12 MG/DL — SIGNIFICANT CHANGE UP (ref 10–20)
CALCIUM SERPL-MCNC: 8.8 MG/DL — SIGNIFICANT CHANGE UP (ref 8.5–10.1)
CHLORIDE SERPL-SCNC: 102 MMOL/L — SIGNIFICANT CHANGE UP (ref 98–110)
CO2 SERPL-SCNC: 30 MMOL/L — SIGNIFICANT CHANGE UP (ref 17–32)
CREAT SERPL-MCNC: 0.9 MG/DL — SIGNIFICANT CHANGE UP (ref 0.7–1.5)
EOSINOPHIL # BLD AUTO: 0.25 K/UL — SIGNIFICANT CHANGE UP (ref 0–0.7)
EOSINOPHIL NFR BLD AUTO: 2.9 % — SIGNIFICANT CHANGE UP (ref 0–8)
ESTIMATED AVERAGE GLUCOSE: 206 MG/DL — HIGH (ref 68–114)
GLUCOSE BLDC GLUCOMTR-MCNC: 104 MG/DL — HIGH (ref 70–99)
GLUCOSE BLDC GLUCOMTR-MCNC: 200 MG/DL — HIGH (ref 70–99)
GLUCOSE BLDC GLUCOMTR-MCNC: 245 MG/DL — HIGH (ref 70–99)
GLUCOSE BLDC GLUCOMTR-MCNC: 254 MG/DL — HIGH (ref 70–99)
GLUCOSE BLDC GLUCOMTR-MCNC: 98 MG/DL — SIGNIFICANT CHANGE UP (ref 70–99)
GLUCOSE SERPL-MCNC: 101 MG/DL — HIGH (ref 70–99)
HBA1C BLD-MCNC: 8.8 % — HIGH (ref 4–5.6)
HCT VFR BLD CALC: 41.2 % — SIGNIFICANT CHANGE UP (ref 37–47)
HGB BLD-MCNC: 12.8 G/DL — SIGNIFICANT CHANGE UP (ref 12–16)
IMM GRANULOCYTES NFR BLD AUTO: 0.5 % — HIGH (ref 0.1–0.3)
LYMPHOCYTES # BLD AUTO: 1.75 K/UL — SIGNIFICANT CHANGE UP (ref 1.2–3.4)
LYMPHOCYTES # BLD AUTO: 20.2 % — LOW (ref 20.5–51.1)
MAGNESIUM SERPL-MCNC: 2 MG/DL — SIGNIFICANT CHANGE UP (ref 1.8–2.4)
MCHC RBC-ENTMCNC: 28.3 PG — SIGNIFICANT CHANGE UP (ref 27–31)
MCHC RBC-ENTMCNC: 31.1 G/DL — LOW (ref 32–37)
MCV RBC AUTO: 90.9 FL — SIGNIFICANT CHANGE UP (ref 81–99)
MONOCYTES # BLD AUTO: 0.79 K/UL — HIGH (ref 0.1–0.6)
MONOCYTES NFR BLD AUTO: 9.1 % — SIGNIFICANT CHANGE UP (ref 1.7–9.3)
NEUTROPHILS # BLD AUTO: 5.77 K/UL — SIGNIFICANT CHANGE UP (ref 1.4–6.5)
NEUTROPHILS NFR BLD AUTO: 66.7 % — SIGNIFICANT CHANGE UP (ref 42.2–75.2)
NRBC # BLD: 0 /100 WBCS — SIGNIFICANT CHANGE UP (ref 0–0)
PLATELET # BLD AUTO: 208 K/UL — SIGNIFICANT CHANGE UP (ref 130–400)
POTASSIUM SERPL-MCNC: 4.8 MMOL/L — SIGNIFICANT CHANGE UP (ref 3.5–5)
POTASSIUM SERPL-SCNC: 4.8 MMOL/L — SIGNIFICANT CHANGE UP (ref 3.5–5)
PROT SERPL-MCNC: 6.3 G/DL — SIGNIFICANT CHANGE UP (ref 6–8)
RBC # BLD: 4.53 M/UL — SIGNIFICANT CHANGE UP (ref 4.2–5.4)
RBC # FLD: 14.8 % — HIGH (ref 11.5–14.5)
SODIUM SERPL-SCNC: 141 MMOL/L — SIGNIFICANT CHANGE UP (ref 135–146)
WBC # BLD: 8.65 K/UL — SIGNIFICANT CHANGE UP (ref 4.8–10.8)
WBC # FLD AUTO: 8.65 K/UL — SIGNIFICANT CHANGE UP (ref 4.8–10.8)

## 2019-04-22 PROCEDURE — 93306 TTE W/DOPPLER COMPLETE: CPT | Mod: 26

## 2019-04-22 RX ADMIN — AMLODIPINE BESYLATE 5 MILLIGRAM(S): 2.5 TABLET ORAL at 05:51

## 2019-04-22 RX ADMIN — Medication 20 MILLIGRAM(S): at 12:20

## 2019-04-22 RX ADMIN — ENOXAPARIN SODIUM 40 MILLIGRAM(S): 100 INJECTION SUBCUTANEOUS at 21:26

## 2019-04-22 RX ADMIN — Medication 40 MILLIGRAM(S): at 05:51

## 2019-04-22 RX ADMIN — Medication 20 MILLIEQUIVALENT(S): at 12:19

## 2019-04-22 RX ADMIN — LISINOPRIL 10 MILLIGRAM(S): 2.5 TABLET ORAL at 05:51

## 2019-04-22 NOTE — PROGRESS NOTE ADULT - SUBJECTIVE AND OBJECTIVE BOX
DORYSBASILMAGDALENA  89y  Female      Patient is a 89y old  Female who presents with a chief complaint of shortness of breath (22 Apr 2019 10:32)      INTERVAL HPI/OVERNIGHT EVENTS:      ******************************* REVIEW OF SYSTEMS:**********************************************    All other review of systems negative    *********************** VITALS ******************************************    T(F): 97.2 (04-22-19 @ 12:28)  HR: 65 (04-22-19 @ 12:28) (55 - 68)  BP: 119/57 (04-22-19 @ 12:28) (119/57 - 159/71)  RR: 18 (04-22-19 @ 12:28) (17 - 18)  SpO2: --            ******************************** PHYSICAL EXAM:**************************************************  GENERAL: NAD    PSYCH: no agitation, baseline mentation  HEENT:     NERVOUS SYSTEM:  Alert & Oriented X3,   PULMONARY: HARDEEP, CTA    CARDIOVASCULAR: S1S2 RRR    GI: Soft, NT, ND; BS present.    EXTREMITIES:  2+ Peripheral Pulses, No clubbing, cyanosis, or edema    LYMPH: No lymphadenopathy noted    SKIN: No rashes or lesions    ******************************************************************************************    Consultant(s) Notes Reviewed:  [x ] YES  [ ] NO    Discussed with Consultants/Other Providers [ x] YES     **************************** LABS *******************************************************                          12.8   8.65  )-----------( 208      ( 22 Apr 2019 06:14 )             41.2     04-22    141  |  102  |  12  ----------------------------<  101<H>  4.8   |  30  |  0.9    Ca    8.8      22 Apr 2019 06:14  Mg     2.0     04-22    TPro  6.3  /  Alb  3.3<L>  /  TBili  0.6  /  DBili  x   /  AST  20  /  ALT  21  /  AlkPhos  208<H>  04-22          Lactate Trend  04-21 @ 06:39 Lactate:0.6   04-20 @ 08:39 Lactate:1.0     CARDIAC MARKERS ( 21 Apr 2019 06:39 )  x     / <0.01 ng/mL / x     / x     / x          CAPILLARY BLOOD GLUCOSE      POCT Blood Glucose.: 245 mg/dL (22 Apr 2019 11:46)          **************************Active Medications *******************************************  No Known Allergies      amLODIPine   Tablet 5 milliGRAM(s) Oral daily  chlorhexidine 2% Cloths 1 Application(s) Topical <User Schedule>  dextrose 40% Gel 15 Gram(s) Oral once PRN  dextrose 5%. 1000 milliLiter(s) IV Continuous <Continuous>  dextrose 50% Injectable 12.5 Gram(s) IV Push once  dextrose 50% Injectable 25 Gram(s) IV Push once  dextrose 50% Injectable 25 Gram(s) IV Push once  enoxaparin Injectable 40 milliGRAM(s) SubCutaneous at bedtime  furosemide    Tablet 40 milliGRAM(s) Oral daily  glucagon  Injectable 1 milliGRAM(s) IntraMuscular once PRN  lisinopril 10 milliGRAM(s) Oral daily  PARoxetine 20 milliGRAM(s) Oral daily  potassium chloride    Tablet ER 20 milliEquivalent(s) Oral daily      ***************************************************  RADIOLOGY & ADDITIONAL TESTS:    Imaging Personally Reviewed:  [ ] YES  [ ] NO    HEALTH ISSUES - PROBLEM Dx:

## 2019-04-22 NOTE — CONSULT NOTE ADULT - SUBJECTIVE AND OBJECTIVE BOX
HPI:  89 y.o. f w/ PMHx of DM, HTN, OA presents with a chief complaint of shortness of breath on exertion that has been progressively worsening for the past week. Patient is on lasix at home but has no underlying cardiac history. Symptoms are exacerbated by lying flat and exerting herself. Symptoms are better when she sits up. Patient denies any changes in her medications. Patient denies having COPD or asthma. Denies any fevers, chills, or night sweats. Patient denies any recent travel or any change in here diet. Patient denies any chest pain or palpitations.    PMD- Dr. Gill Beltrán    Pharm- [facility] on [address] (20 Apr 2019 11:55)      PAST MEDICAL & SURGICAL HISTORY:  Pneumothorax  Diverticulosis  Macular degeneration, age related  Glaucoma  HTN (hypertension)  DM (diabetes mellitus)  H/O hernia repair      Hospital Course:  Medically stabilized    TODAY'S SUBJECTIVE & REVIEW OF SYMPTOMS:     Constitutional WNL   Cardio WNL   Resp WNL   GI WNL  Heme WNL  Endo WNL  Skin WNL  MSK WNL  Neuro WNL  Cognitive WNL  Psych WNL      MEDICATIONS  (STANDING):  amLODIPine   Tablet 5 milliGRAM(s) Oral daily  chlorhexidine 2% Cloths 1 Application(s) Topical <User Schedule>  dextrose 5%. 1000 milliLiter(s) (50 mL/Hr) IV Continuous <Continuous>  dextrose 50% Injectable 12.5 Gram(s) IV Push once  dextrose 50% Injectable 25 Gram(s) IV Push once  dextrose 50% Injectable 25 Gram(s) IV Push once  enoxaparin Injectable 40 milliGRAM(s) SubCutaneous at bedtime  furosemide    Tablet 40 milliGRAM(s) Oral daily  lisinopril 10 milliGRAM(s) Oral daily  PARoxetine 20 milliGRAM(s) Oral daily  potassium chloride    Tablet ER 20 milliEquivalent(s) Oral daily    MEDICATIONS  (PRN):  dextrose 40% Gel 15 Gram(s) Oral once PRN Blood Glucose LESS THAN 70 milliGRAM(s)/deciliter  glucagon  Injectable 1 milliGRAM(s) IntraMuscular once PRN Glucose LESS THAN 70 milligrams/deciliter      FAMILY HISTORY:  No pertinent family history in first degree relatives      Allergies    No Known Allergies    Intolerances        SOCIAL HISTORY:    [  ] EtOH  [  ] Smoking  [  ] Substance abuse     Home Environment:  [  ] Home Alone  [  ] Lives with Family  [  ] Home Health Aide    Dwelling:  [  ] Apartment  [  ] Private House  [  ] Adult Home  [  ] Skilled Nursing Facility      [  ] Short Term  [  ] Long Term  [  ] Stairs       Elevator [  ]    FUNCTIONAL STATUS PTA: (Check all that apply)  Ambulation: [   ]Independent    [  ] Dependent     [  ] Non-Ambulatory  Assistive Device: [  ] SA Cane  [  ]  Q Cane  [  ] Walker  [  ]  Wheelchair  ADL: [  ] Independent  [  ]  Dependent       Vital Signs Last 24 Hrs  T(C): 36.9 (22 Apr 2019 05:34), Max: 36.9 (22 Apr 2019 05:34)  T(F): 98.4 (22 Apr 2019 05:34), Max: 98.4 (22 Apr 2019 05:34)  HR: 55 (22 Apr 2019 05:34) (55 - 68)  BP: 128/61 (22 Apr 2019 05:34) (124/58 - 159/71)  BP(mean): --  RR: 17 (22 Apr 2019 05:34) (17 - 18)  SpO2: --      PHYSICAL EXAM: Alert & Oriented X 3  GENERAL: NAD, well-groomed, well-developed  HEAD:  Atraumatic, Normocephalic  EYES: EOMI, PERRLA, conjunctiva and sclera clear  NECK: Supple, No JVD, Normal thyroid  CHEST/LUNG: Clear to percussion bilaterally; No rales, rhonchi, wheezing, or rubs  HEART: Regular rate and rhythm; No murmurs, rubs, or gallops  ABDOMEN: Soft, Nontender, Nondistended; Bowel sounds present  EXTREMITIES:  2+ Peripheral Pulses, No clubbing, cyanosis, or edema    NERVOUS SYSTEM:  Cranial Nerves 2-12 intact [  ] Abnormal  [  ]  ROM: WFL all extremities [  ]  Abnormal [  ]  Motor Strength: WFL all extremities  [  ]  Abnormal [  ]  Sensation: intact to light touch [  ] Abnormal [  ]  Reflexes: Symmetric [  ]  Abnormal [  ]    FUNCTIONAL STATUS:  Bed Mobility: Independent [  ]  Supervision [  ]  Needs Assistance [  ]  N/A [  ]  Transfers: Independent [  ]  Supervision [  ]  Needs Assistance [  ]  N/A [  ]   Ambulation: Independent [  ]  Supervision [  ]  Needs Assistance [  ]  N/A [  ]  ADL: Independent [  ] Requires Assistance [  ] N/A [  ]      LABS:                        12.8   8.65  )-----------( 208      ( 22 Apr 2019 06:14 )             41.2     04-22    141  |  102  |  12  ----------------------------<  101<H>  4.8   |  30  |  0.9    Ca    8.8      22 Apr 2019 06:14  Mg     2.0     04-22    TPro  6.3  /  Alb  3.3<L>  /  TBili  0.6  /  DBili  x   /  AST  20  /  ALT  21  /  AlkPhos  208<H>  04-22          RADIOLOGY & ADDITIONAL STUDIES:    EXAM:  XR CHEST FRONTAL 1V            PROCEDURE DATE:  04/21/2019            INTERPRETATION:  Clinical History / Reason for exam: Shortness of breath.    Comparison : Chest radiograph 04/20/2019.    Technique/Positioning: Frontal view of the chest.    Findings:    Support devices: None.    Cardiac/mediastinum/hilum: Aortic calcifications. Enlarged cardiac   silhouette.    Lung parenchyma/Pleura: Within normal limits. Previously noted left upper   lobe opacity not discretely visualized.    Skeleton/soft tissues: Unremarkable.    Impression:      No radiographic evidence of acute cardiopulmonary disease. HPI:  89 y.o. right-handed white f w/ PMHx of DM, HTN, OA presents with a chief complaint of shortness of breath on exertion that has been progressively worsening for the past week. Patient is on lasix at home but has no underlying cardiac history. Symptoms are exacerbated by lying flat and exerting herself. Symptoms are better when she sits up. Patient denies any changes in her medications. Patient denies having COPD or asthma. Denies any fevers, chills, or night sweats. Patient denies any recent travel or any change in here diet. Patient denies any chest pain or palpitations.    PMD- Dr. Gill Beltrán    Pharm- [facility] on [address] (20 Apr 2019 11:55)      PAST MEDICAL & SURGICAL HISTORY:  Pneumothorax  Diverticulosis  Macular degeneration, age related  Glaucoma  HTN (hypertension)  DM (diabetes mellitus)  H/O hernia repair      Hospital Course:  Medically stabilized    TODAY'S SUBJECTIVE & REVIEW OF SYMPTOMS:     Constitutional WNL   Cardio WNL   Resp + mild SOB   GI WNL  Heme WNL  Endo WNL  Skin WNL  MSK WNL  Neuro WNL  Cognitive WNL  Psych WNL      MEDICATIONS  (STANDING):  amLODIPine   Tablet 5 milliGRAM(s) Oral daily  chlorhexidine 2% Cloths 1 Application(s) Topical <User Schedule>  dextrose 5%. 1000 milliLiter(s) (50 mL/Hr) IV Continuous <Continuous>  dextrose 50% Injectable 12.5 Gram(s) IV Push once  dextrose 50% Injectable 25 Gram(s) IV Push once  dextrose 50% Injectable 25 Gram(s) IV Push once  enoxaparin Injectable 40 milliGRAM(s) SubCutaneous at bedtime  furosemide    Tablet 40 milliGRAM(s) Oral daily  lisinopril 10 milliGRAM(s) Oral daily  PARoxetine 20 milliGRAM(s) Oral daily  potassium chloride    Tablet ER 20 milliEquivalent(s) Oral daily    MEDICATIONS  (PRN):  dextrose 40% Gel 15 Gram(s) Oral once PRN Blood Glucose LESS THAN 70 milliGRAM(s)/deciliter  glucagon  Injectable 1 milliGRAM(s) IntraMuscular once PRN Glucose LESS THAN 70 milligrams/deciliter      FAMILY HISTORY:  No pertinent family history in first degree relatives      Allergies    No Known Allergies    Intolerances        SOCIAL HISTORY:    [ X ] EtOH--social  [  ] Smoking  [  ] Substance abuse     Home Environment:  [  ] Home Alone  [ X ] Lives with Family--daughter for the past year  [  ] Home Health Aide    Dwelling:  [  ] Apartment  [ X ] Private House  [  ] Adult Home  [  ] Skilled Nursing Facility      [  ] Short Term  [  ] Long Term  [ X ] Stairs 8 step entry and one flight inside that will be optional upon discharge      Elevator [  ]    FUNCTIONAL STATUS PTA: (Check all that apply)  Ambulation: [ X ]Independent    [  ] Dependent     [  ] Non-Ambulatory  Assistive Device: [  ] SA Cane  [  ]  Q Cane  [  ] Walker  [  ]  Wheelchair  ADL: [ X ] Independent  [  ]  Dependent       Vital Signs Last 24 Hrs  T(C): 36.9 (22 Apr 2019 05:34), Max: 36.9 (22 Apr 2019 05:34)  T(F): 98.4 (22 Apr 2019 05:34), Max: 98.4 (22 Apr 2019 05:34)  HR: 55 (22 Apr 2019 05:34) (55 - 68)  BP: 128/61 (22 Apr 2019 05:34) (124/58 - 159/71)  BP(mean): --  RR: 17 (22 Apr 2019 05:34) (17 - 18)  SpO2: --      PHYSICAL EXAM: Alert & Oriented X 3  GENERAL: NAD, well-groomed, well-developed  HEAD:  Atraumatic, Normocephalic  EYES: EOMI, PERRLA, conjunctiva and sclera clear  NECK: Supple, No JVD, Normal thyroid  CHEST/LUNG: +kyphotic. Clear to auscultation bilaterally; No rales, rhonchi, wheezing  HEART: Regular rate and rhythm; No murmurs, rubs, or gallops  ABDOMEN: Soft, Nontender, Nondistended; Bowel sounds present  EXTREMITIES:  2+ Peripheral Pulses, No clubbing, cyanosis, or edema    NERVOUS SYSTEM:  Cranial Nerves 2-12 intact [ X ] Abnormal  [  ]  ROM: WFL all extremities [ X ]  Abnormal [  ]  Motor Strength: WFL all extremities  [  ]  Abnormal [ X ]  Sensation: intact to light touch [ X ] Abnormal [  ]  Reflexes: Symmetric [  ]  Abnormal [  ]    FUNCTIONAL STATUS:  Bed Mobility: Independent [X  ]  Supervision [  ]  Needs Assistance [  ]  N/A [  ]  Transfers: Independent [  ]  Supervision [  ]  Needs Assistance [X  ]  N/A [  ]   Ambulation: Independent [  ]  Supervision [  ]  Needs Assistance [X  ]  N/A [  ]  ADL: Independent [ X ] Requires Assistance [  ] N/A [  ]      LABS:                        12.8   8.65  )-----------( 208      ( 22 Apr 2019 06:14 )             41.2     04-22    141  |  102  |  12  ----------------------------<  101<H>  4.8   |  30  |  0.9    Ca    8.8      22 Apr 2019 06:14  Mg     2.0     04-22    TPro  6.3  /  Alb  3.3<L>  /  TBili  0.6  /  DBili  x   /  AST  20  /  ALT  21  /  AlkPhos  208<H>  04-22          RADIOLOGY & ADDITIONAL STUDIES:    EXAM:  XR CHEST FRONTAL 1V            PROCEDURE DATE:  04/21/2019            INTERPRETATION:  Clinical History / Reason for exam: Shortness of breath.    Comparison : Chest radiograph 04/20/2019.    Technique/Positioning: Frontal view of the chest.    Findings:    Support devices: None.    Cardiac/mediastinum/hilum: Aortic calcifications. Enlarged cardiac   silhouette.    Lung parenchyma/Pleura: Within normal limits. Previously noted left upper   lobe opacity not discretely visualized.    Skeleton/soft tissues: Unremarkable.    Impression:      No radiographic evidence of acute cardiopulmonary disease.

## 2019-04-22 NOTE — PHYSICAL THERAPY INITIAL EVALUATION ADULT - CRITERIA FOR SKILLED THERAPEUTIC INTERVENTIONS
risk reduction/prevention/rehab potential/functional limitations in following categories/impairments found/therapy frequency

## 2019-04-22 NOTE — CONSULT NOTE ADULT - ASSESSMENT
IMPRESSION: Rehab for deconditioning    PRECAUTIONS: [  ] Cardiac  [  ] Respiratory  [  ] Seizures [  ] Contact Isolation  [  ] Droplet Isolation  [  ] Other    Weight Bearing Status:     RECOMMENDATION:    Out of Bed to Chair     DVT/Decubiti Prophylaxis    REHAB PLAN:     [ X ] Bedside P/T 3-5 times a week   [   ]   Bedside O/T  2-3 times a week             [   ] No Rehab Therapy Indicated                   [   ]  Speech Therapy   Conditioning/ROM                                    ADL  Bed Mobility                                               Conditioning/ROM  Transfers                                                     Bed Mobility  Sitting /Standing Balance                         Transfers                                        Gait Training                                               Sitting/Standing Balance  Stair Training [   ]Applicable                    Home equipment Eval                                                                        Splinting  [   ] Only      GOALS:   ADL   [   ]   Independent                    Transfers  [  X ] Independent                          Ambulation  [  X ] Independent     [  X  ] With device                            [   ]  CG                                                         [   ]  CG                                                                  [   ] CG                            [    ] Min A                                                   [   ] Min A                                                              [   ] Min  A          DISCHARGE PLAN:   [   ]  Good candidate for Intensive Rehabilitation/Hospital based-[facility]A [facility]                                             Will tolerate 3hrs Intensive Rehab Daily                                       [  X ]  Short Term Rehab in Skilled Nursing Facility                                       [  X  ]  Home with Outpatient or VN services                                         [    ]  Possible Candidate for Intensive Hospital based Rehab      Thank you. IMPRESSION: Rehab for deconditioning    PRECAUTIONS: [  ] Cardiac  [  ] Respiratory  [  ] Seizures [  ] Contact Isolation  [  ] Droplet Isolation  [  ] Other    Weight Bearing Status:  WBAT    RECOMMENDATION:    Out of Bed to Chair     DVT/Decubiti Prophylaxis    REHAB PLAN:     [ X ] Bedside P/T 3-5 times a week   [   ]   Bedside O/T  2-3 times a week             [   ] No Rehab Therapy Indicated                   [   ]  Speech Therapy   Conditioning/ROM                                    ADL  Bed Mobility                                               Conditioning/ROM  Transfers                                                     Bed Mobility  Sitting /Standing Balance                         Transfers                                        Gait Training                                               Sitting/Standing Balance  Stair Training [ X  ]Applicable                    Home equipment Eval                                                                        Splinting  [   ] Only      GOALS:   ADL   [   ]   Independent                    Transfers  [  X ] Independent                          Ambulation  [  X ] Independent     [  X  ] With device                            [   ]  CG                                                         [   ]  CG                                                                  [   ] CG                            [    ] Min A                                                   [   ] Min A                                                              [   ] Min  A          DISCHARGE PLAN:   [   ]  Good candidate for Intensive Rehabilitation/Hospital based-[facility]A [facility]                                             Will tolerate 3hrs Intensive Rehab Daily                                       [  X ]  Short Term Rehab in Skilled Nursing Facility                                       [  X  ]  Home with Outpatient or  services                                         [    ]  Possible Candidate for Intensive Hospital based Rehab      Thank you.

## 2019-04-22 NOTE — DISCHARGE NOTE NURSING/CASE MANAGEMENT/SOCIAL WORK - NSDCDPATPORTLINK_GEN_ALL_CORE
You can access the [facility] Portal, offered by [facility], by registering with the following website: [email or web address]

## 2019-04-22 NOTE — PHYSICAL THERAPY INITIAL EVALUATION ADULT - GAIT DISTANCE, PT EVAL
70'x1 using RW, then another 60'x 1 HHA, + O2 2 lpmn via NC 70'x1 using RW, then another 60'x 1 HHA, O2 at RA 85-86%, wity O2 2 lpm NC recuperated to 92% NC.

## 2019-04-22 NOTE — PHYSICAL THERAPY INITIAL EVALUATION ADULT - GENERAL OBSERVATIONS, REHAB EVAL
10:00-10:40. Pt encountered semifowler in bed in NAD, + O2 2 lpm via NC, daughters present at b/s . Pt agreeable to PT.

## 2019-04-23 ENCOUNTER — TRANSCRIPTION ENCOUNTER (OUTPATIENT)
Age: 84
End: 2019-04-23

## 2019-04-23 VITALS
RESPIRATION RATE: 18 BRPM | SYSTOLIC BLOOD PRESSURE: 135 MMHG | DIASTOLIC BLOOD PRESSURE: 64 MMHG | TEMPERATURE: 97 F | HEART RATE: 70 BPM

## 2019-04-23 LAB
ANION GAP SERPL CALC-SCNC: 9 MMOL/L — SIGNIFICANT CHANGE UP (ref 7–14)
BASE EXCESS BLDA CALC-SCNC: 6.1 MMOL/L — HIGH (ref -2–2)
BUN SERPL-MCNC: 13 MG/DL — SIGNIFICANT CHANGE UP (ref 10–20)
CALCIUM SERPL-MCNC: 9 MG/DL — SIGNIFICANT CHANGE UP (ref 8.5–10.1)
CHLORIDE SERPL-SCNC: 102 MMOL/L — SIGNIFICANT CHANGE UP (ref 98–110)
CO2 SERPL-SCNC: 32 MMOL/L — SIGNIFICANT CHANGE UP (ref 17–32)
CREAT SERPL-MCNC: 0.9 MG/DL — SIGNIFICANT CHANGE UP (ref 0.7–1.5)
GAS PNL BLDA: SIGNIFICANT CHANGE UP
GLUCOSE BLDC GLUCOMTR-MCNC: 196 MG/DL — HIGH (ref 70–99)
GLUCOSE BLDC GLUCOMTR-MCNC: 293 MG/DL — HIGH (ref 70–99)
GLUCOSE BLDC GLUCOMTR-MCNC: 301 MG/DL — HIGH (ref 70–99)
GLUCOSE SERPL-MCNC: 164 MG/DL — HIGH (ref 70–99)
HCO3 BLDA-SCNC: 32 MMOL/L — HIGH (ref 23–27)
HCT VFR BLD CALC: 42.3 % — SIGNIFICANT CHANGE UP (ref 37–47)
HGB BLD-MCNC: 13.4 G/DL — SIGNIFICANT CHANGE UP (ref 12–16)
HOROWITZ INDEX BLDA+IHG-RTO: 28 — SIGNIFICANT CHANGE UP
MAGNESIUM SERPL-MCNC: 1.9 MG/DL — SIGNIFICANT CHANGE UP (ref 1.8–2.4)
MCHC RBC-ENTMCNC: 28.4 PG — SIGNIFICANT CHANGE UP (ref 27–31)
MCHC RBC-ENTMCNC: 31.7 G/DL — LOW (ref 32–37)
MCV RBC AUTO: 89.6 FL — SIGNIFICANT CHANGE UP (ref 81–99)
NRBC # BLD: 0 /100 WBCS — SIGNIFICANT CHANGE UP (ref 0–0)
PCO2 BLDA: 48 MMHG — HIGH (ref 38–42)
PH BLDA: 7.43 — HIGH (ref 7.38–7.42)
PLATELET # BLD AUTO: 206 K/UL — SIGNIFICANT CHANGE UP (ref 130–400)
PO2 BLDA: 107 MMHG — HIGH (ref 78–95)
POTASSIUM SERPL-MCNC: 3.7 MMOL/L — SIGNIFICANT CHANGE UP (ref 3.5–5)
POTASSIUM SERPL-SCNC: 3.7 MMOL/L — SIGNIFICANT CHANGE UP (ref 3.5–5)
RBC # BLD: 4.72 M/UL — SIGNIFICANT CHANGE UP (ref 4.2–5.4)
RBC # FLD: 14.6 % — HIGH (ref 11.5–14.5)
SAO2 % BLDA: 98 % — SIGNIFICANT CHANGE UP (ref 94–98)
SODIUM SERPL-SCNC: 143 MMOL/L — SIGNIFICANT CHANGE UP (ref 135–146)
WBC # BLD: 6.61 K/UL — SIGNIFICANT CHANGE UP (ref 4.8–10.8)
WBC # FLD AUTO: 6.61 K/UL — SIGNIFICANT CHANGE UP (ref 4.8–10.8)

## 2019-04-23 PROCEDURE — 93970 EXTREMITY STUDY: CPT | Mod: 26

## 2019-04-23 RX ORDER — FUROSEMIDE 40 MG
1 TABLET ORAL
Qty: 30 | Refills: 0
Start: 2019-04-23 | End: 2019-05-22

## 2019-04-23 RX ORDER — FUROSEMIDE 40 MG
1 TABLET ORAL
Qty: 0 | Refills: 0 | COMMUNITY

## 2019-04-23 RX ORDER — PROPRANOLOL HCL 160 MG
1 CAPSULE, EXTENDED RELEASE 24HR ORAL
Qty: 0 | Refills: 0 | COMMUNITY

## 2019-04-23 RX ADMIN — Medication 40 MILLIGRAM(S): at 05:43

## 2019-04-23 RX ADMIN — AMLODIPINE BESYLATE 5 MILLIGRAM(S): 2.5 TABLET ORAL at 05:43

## 2019-04-23 RX ADMIN — LISINOPRIL 10 MILLIGRAM(S): 2.5 TABLET ORAL at 05:43

## 2019-04-23 NOTE — PROGRESS NOTE ADULT - SUBJECTIVE AND OBJECTIVE BOX
LALO FISCHERCORBY  89y  Female      Patient is a 89y old  Female who presents with a chief complaint of shortness of breath (22 Apr 2019 13:20)      INTERVAL HPI/OVERNIGHT EVENTS:      ******************************* REVIEW OF SYSTEMS:**********************************************  Remains comfortable with supplemental O2  All other review of systems negative    *********************** VITALS ******************************************    T(F): 97.1 (04-23-19 @ 13:17)  HR: 70 (04-23-19 @ 13:17) (64 - 74)  BP: 135/64 (04-23-19 @ 13:17) (117/60 - 168/74)  RR: 18 (04-23-19 @ 13:17) (18 - 20)  SpO2: --            ******************************** PHYSICAL EXAM:**************************************************  GENERAL: NAD    PSYCH: no agitation, baseline mentation  HEENT:     NERVOUS SYSTEM:  Alert & Oriented X3, MS  5/5 B/L  UE and LE ; Sensory intact    PULMONARY: HARDEEP, CTA    CARDIOVASCULAR: S1S2 RRR    GI: Soft, NT, ND; BS present.    EXTREMITIES:  2+ Peripheral Pulses, No clubbing, cyanosis, or edema    LYMPH: No lymphadenopathy noted    SKIN: No rashes or lesions    ******************************************************************************************    Consultant(s) Notes Reviewed:  [x ] YES  [ ] NO    Discussed with Consultants/Other Providers [ x] YES     **************************** LABS *******************************************************                          13.4   6.61  )-----------( 206      ( 23 Apr 2019 06:07 )             42.3     04-23    143  |  102  |  13  ----------------------------<  164<H>  3.7   |  32  |  0.9    Ca    9.0      23 Apr 2019 06:07  Mg     1.9     04-23    TPro  6.3  /  Alb  3.3<L>  /  TBili  0.6  /  DBili  x   /  AST  20  /  ALT  21  /  AlkPhos  208<H>  04-22    ABG - ( 22 Apr 2019 14:44 )  pH, Arterial: 7.43  pH, Blood: x     /  pCO2: 48    /  pO2: 107   / HCO3: 32    / Base Excess: 6.1   /  SaO2: 98                    Lactate Trend  04-21 @ 06:39 Lactate:0.6   04-20 @ 08:39 Lactate:1.0         CAPILLARY BLOOD GLUCOSE      POCT Blood Glucose.: 293 mg/dL (23 Apr 2019 11:19)          **************************Active Medications *******************************************  No Known Allergies      amLODIPine   Tablet 5 milliGRAM(s) Oral daily  chlorhexidine 2% Cloths 1 Application(s) Topical <User Schedule>  dextrose 40% Gel 15 Gram(s) Oral once PRN  dextrose 5%. 1000 milliLiter(s) IV Continuous <Continuous>  dextrose 50% Injectable 12.5 Gram(s) IV Push once  dextrose 50% Injectable 25 Gram(s) IV Push once  dextrose 50% Injectable 25 Gram(s) IV Push once  enoxaparin Injectable 40 milliGRAM(s) SubCutaneous at bedtime  furosemide    Tablet 40 milliGRAM(s) Oral daily  glucagon  Injectable 1 milliGRAM(s) IntraMuscular once PRN  lisinopril 10 milliGRAM(s) Oral daily  PARoxetine 20 milliGRAM(s) Oral daily  potassium chloride    Tablet ER 20 milliEquivalent(s) Oral daily      ***************************************************  RADIOLOGY & ADDITIONAL TESTS:    Imaging Personally Reviewed:  [ ] YES  [ ] NO    HEALTH ISSUES - PROBLEM Dx:

## 2019-04-23 NOTE — DISCHARGE NOTE PROVIDER - CARE PROVIDER_API CALL
Jessie Ledesma ()  Internal Medicine  [address], [address]  [place] [address]  Phone: [phone]  Fax: [phone]  Follow Up Time: 1 week    Asad Jernigan)  Internal Medicine; Pulmonary Disease  [address] [place] [address], [address]  [place] [address]  Phone: [phone]  Fax: [phone]  Follow Up Time: 1 week

## 2019-04-23 NOTE — PROGRESS NOTE ADULT - ASSESSMENT
MAGDALENA FISCHER   89y   Female    MRN#: [id number]         SUBJECTIVE  Patient is a 89y old Female who presents with a chief complaint of shortness of breath (22 Apr 2019 13:20)  Currently admitted to medicine with the primary diagnosis of Shortness of breath    Hospital course has been uncomplicated so far : BNP on admission was 1079, chest x ray was done on admission and showed no radiographic evidence of acute cardiopulmonary disease. Patient was continued on Lasix 40 daily. TTE was performed, results pending      Today is hospital day 2d, and this morning she doesn't report any overnight events     Present Today:           Bowden Catheter X          Central Line X          IV Fluids X          Drains X      OBJECTIVE  ------------------------------------    PAST MEDICAL & SURGICAL HISTORY  Pneumothorax  Diverticulosis  Macular degeneration, age related  Glaucoma  HTN (hypertension)  DM (diabetes mellitus)  H/O hernia repair      ALLERGIES:  No Known Allergies      MEDICATIONS:  STANDING MEDICATIONS  amLODIPine   Tablet 5 milliGRAM(s) Oral daily  chlorhexidine 2% Cloths 1 Application(s) Topical <User Schedule>  dextrose 5%. 1000 milliLiter(s) IV Continuous <Continuous>  dextrose 50% Injectable 12.5 Gram(s) IV Push once  dextrose 50% Injectable 25 Gram(s) IV Push once  dextrose 50% Injectable 25 Gram(s) IV Push once  enoxaparin Injectable 40 milliGRAM(s) SubCutaneous at bedtime  furosemide    Tablet 40 milliGRAM(s) Oral daily  lisinopril 10 milliGRAM(s) Oral daily  PARoxetine 20 milliGRAM(s) Oral daily  potassium chloride    Tablet ER 20 milliEquivalent(s) Oral daily    PRN MEDICATIONS  dextrose 40% Gel 15 Gram(s) Oral once PRN  glucagon  Injectable 1 milliGRAM(s) IntraMuscular once PRN        LABS:                        12.8   8.65  )-----------( 208      ( 22 Apr 2019 06:14 )             41.2     04-22    141  |  102  |  12  ----------------------------<  101<H>  4.8   |  30  |  0.9    Ca    8.8      22 Apr 2019 06:14  Mg     2.0     04-22    TPro  6.3  /  Alb  3.3<L>  /  TBili  0.6  /  DBili  x   /  AST  20  /  ALT  21  /  AlkPhos  208<H>  04-22              CARDIAC MARKERS ( 21 Apr 2019 06:39 )  x     / <0.01 ng/mL / x     / x     / x            RADIOLOGY:    < from: Xray Chest 1 View AP/PA (04.21.19 @ 08:39) >  No radiographic evidence of acute cardiopulmonary disease.    PHYSICAL EXAM:  VITAL SIGNS: Last 24 Hours  T(C): 36.2 (22 Apr 2019 12:28), Max: 36.9 (22 Apr 2019 05:34)  T(F): 97.2 (22 Apr 2019 12:28), Max: 98.4 (22 Apr 2019 05:34)  HR: 65 (22 Apr 2019 12:28) (55 - 68)  BP: 119/57 (22 Apr 2019 12:28) (119/57 - 159/71)  BP(mean): --  RR: 18 (22 Apr 2019 12:28) (17 - 18)  SpO2: --    GENERAL: No acute distress, alert  PULMONARY: Clear to auscultation bilaterally; No respiratory distress   CARDIOVASCULAR: Regular rate and rhythm  GASTROINTESTINAL: Soft, Nontender, Nondistended; Bowel sounds present  MUSCULOSKELETAL:  + Peripheral Pulses, No lower extremity edema       ASSESSMENT & PLAN    89 y.o. f w/ PMHx of DM, HTN, OA presents with a chief complaint of shortness of breath on exertion that has been progressively worsening for the past week.    #) BHATIA of unknown origin : CHF vs asthma / COPD vs pulmonary hypertension  -BNP 1079, no evidence of volume overload   -2D Echo performed, results pending - chest x ray negative for acute cardiopulmonary disease   - Lasix 40 daily  - EKG showed bradycardia - Cardiac enzymes negative      #) HTN  - Amlodipine 5 daily + Lisinopril 10 daily    #) DM  - Not on insulin coverage   - FS controlled so far    # DVT prophylaxis : Lovenox   # GI prophylaxis : X  # Activity : Ambulate as tolerated   # Diet : consistent carbohydrate   # Code : DNR / DNI  # Disposition : From home lives with daughter     Patient was admitted for likely COPD exacerbation and / or volume overload and was treated with Lasix. Despite Lasix the patient is still hypoxic on ambulation. Her O2 saturation is 93 % on room air at rest, her saturation on room air drops to 85 % when she ambulates and improves to 98 % on 2 L O2 via NC on ambulation. The patient was tested in a chronic stable state and is aware she is going home with Oxygen
MAGDALENA FISCHER   89y   Female    MRN#: [id number]         SUBJECTIVE  Patient is a 89y old Female who presents with a chief complaint of shortness of breath (23 Apr 2019 14:56)  Currently admitted to medicine with the primary diagnosis of Shortness of breath    Hospital course  Hospital course has been uncomplicated : BNP on admission was 1079, chest x ray was done on admission and showed no radiographic evidence of acute cardiopulmonary disease. Patient was continued on Lasix 40 daily. TTE was performed and showed normal systolic function and normal EF, with mild TR and MR. The patient was assessed on ambulation. at rest on room air she had a good SpO2 however on ambulation her saturation dropped to 85 % on room air, improved on O2 supplementation. The patient qualified for home O2 and O2 2L/min was set up for her. The workup was completed by normal ABG and a venous duplex negative for DVT and superficial thrombophlebitis. Laboratory studies were normal in the hospital and the patient was clinically and hemodynamically stable. Patient was discharged home with a cane and Home O2 and was told to follow up with a pulmonologist as outpatient for PFTs    Today is hospital day 3d, and this morning she states that she overall feels better, she is not dyspneic at rest but gets dyspnea on exertion. Denies any other complaints. No major events overnight     Present Today:           Bowden Catheter X          Central Line X          IV Fluids X          Drains X      OBJECTIVE  ------------------------------------    PAST MEDICAL & SURGICAL HISTORY  Pneumothorax  Diverticulosis  Macular degeneration, age related  Glaucoma  HTN (hypertension)  DM (diabetes mellitus)  H/O hernia repair      ALLERGIES:  No Known Allergies      MEDICATIONS:  STANDING MEDICATIONS  amLODIPine   Tablet 5 milliGRAM(s) Oral daily  chlorhexidine 2% Cloths 1 Application(s) Topical <User Schedule>  dextrose 5%. 1000 milliLiter(s) IV Continuous <Continuous>  dextrose 50% Injectable 12.5 Gram(s) IV Push once  dextrose 50% Injectable 25 Gram(s) IV Push once  dextrose 50% Injectable 25 Gram(s) IV Push once  enoxaparin Injectable 40 milliGRAM(s) SubCutaneous at bedtime  furosemide    Tablet 40 milliGRAM(s) Oral daily  lisinopril 10 milliGRAM(s) Oral daily  PARoxetine 20 milliGRAM(s) Oral daily  potassium chloride    Tablet ER 20 milliEquivalent(s) Oral daily    PRN MEDICATIONS  dextrose 40% Gel 15 Gram(s) Oral once PRN  glucagon  Injectable 1 milliGRAM(s) IntraMuscular once PRN        LABS:                        13.4   6.61  )-----------( 206      ( 23 Apr 2019 06:07 )             42.3     04-23    143  |  102  |  13  ----------------------------<  164<H>  3.7   |  32  |  0.9    Ca    9.0      23 Apr 2019 06:07  Mg     1.9     04-23    TPro  6.3  /  Alb  3.3<L>  /  TBili  0.6  /  DBili  x   /  AST  20  /  ALT  21  /  AlkPhos  208<H>  04-22        ABG - ( 22 Apr 2019 14:44 )  pH, Arterial: 7.43  pH, Blood: x     /  pCO2: 48    /  pO2: 107   / HCO3: 32    / Base Excess: 6.1   /  SaO2: 98            RADIOLOGY:    < from: VA Duplex Lower Ext Vein Scan, Bilat (04.23.19 @ 08:46) >  No evidence of deep venous thrombosis or superficial thrombophlebitis in   the bilateral lower extremities.            PHYSICAL EXAM:  VITAL SIGNS: Last 24 Hours  T(C): 36.2 (23 Apr 2019 13:17), Max: 36.3 (22 Apr 2019 20:28)  T(F): 97.1 (23 Apr 2019 13:17), Max: 97.4 (22 Apr 2019 20:28)  HR: 70 (23 Apr 2019 13:17) (64 - 74)  BP: 135/64 (23 Apr 2019 13:17) (117/60 - 168/74)  BP(mean): --  RR: 18 (23 Apr 2019 13:17) (18 - 20)  SpO2: --    GENERAL: No acute distress, alert  PULMONARY: Clear to auscultation bilaterally; No respiratory distress, on O2 nasal cannula    CARDIOVASCULAR: Regular rate and rhythm  GASTROINTESTINAL: Soft, Nontender, Nondistended; Bowel sounds present  MUSCULOSKELETAL:  + Peripheral Pulses, No lower extremity edema   SKIN : Poor skin turgor       ASSESSMENT & PLAN    89 y.o. f w/ PMHx of DM, HTN, OA presents with a chief complaint of shortness of breath on exertion that has been progressively worsening for the past week.    #) BHATIA most likely secondary to chronic respiratory failure   -BNP 1079, no evidence of volume overload   -2D Echo performed : Normal EF and normal global left systolic ventricular function    - chest x ray negative for acute cardiopulmonary disease   - Lasix 40 daily  - EKG showed bradycardia - Cardiac enzymes negative    - ABG unremarkable   - VA duplex lower extremities negative for DVT   - Outpatient follow up for PFTs     #) HTN  - Amlodipine 5 daily + Lisinopril 10 daily    #) DM  - Not on insulin coverage   - FS controlled so far  - Glimepiride on discharge     # DVT prophylaxis : Lovenox   # GI prophylaxis : X  # Activity : Ambulate as tolerated   # Diet : consistent carbohydrate   # Code : DNR / DNI  # Disposition : Discharged home with Home O2     Patient was admitted for likely COPD exacerbation and / or volume overload and was treated with Lasix. Despite Lasix the patient is still hypoxic on ambulation. Her O2 saturation is 93 % on room air at rest, her saturation on room air drops to 85 % when she ambulates and improves to 98 % on 2 L O2 via NC on ambulation. The patient was tested in a chronic stable state and is aware she is going home with Oxygen
89 y.o. f w/ PMHx of DM, HTN, OA presents with a chief complaint of shortness of breath on exertion that has been progressively worsening for the past week.    #) Acute hypoxic respiratory failure   -doubt CHF  -possible COPD (never smoker).  - Awaiting 2D Echo report.  -c/w home dose lasix  - f/u Venous duplex.  - ABG noted.      #) HTN  -c/w home meds (amlodipine/ benazipril)    #) DM  -basal/ preprandial insulin regimen started    #) DVT/ GI ppx  -lovenox, no GI ppx indicated    #) Code Status  -DNR/ DNI    #) Dispo  -from home, lives w/ daughter, ambulates independently    #Progress Note Handoff  Pending (specify):  2D ECHO/ venous duplex  Family discussion: d/w patient / daughter , agrees with the plan.  Disposition: Home 0-24 hrs
89 y.o. f w/ PMHx of DM, HTN, OA presents with a chief complaint of shortness of breath on exertion that has been progressively worsening for the past week.    #) BHATIA of unknown origin  -doubt CHF  -possible Asthma (never smoker), pulm HTN?  -BNP- 1079, but w/ age expect BNP>1800 and CXR does not reflect volume overload  - Awaiting 2D Echo report >>> r/o valvular dz.  -c/w home dose lasix  -EKG     #) HTN  -c/w home meds (amlodipine/ benazipril)    #) DM  -basal/ preprandial insulin regimen started    #) DVT/ GI ppx  -lovenox, no GI ppx indicated    #) Code Status  -DNR/ DNI    #) Dispo  -from home, lives w/ daughter, ambulates independently  -Physiatry/ PT eval pending    #Progress Note Handoff  Pending (specify):  2D ECHO  Family discussion: d/w patient   Disposition: Home 0-24 hrs

## 2019-04-23 NOTE — DISCHARGE NOTE PROVIDER - NSDCCPCAREPLAN_GEN_ALL_CORE_FT
PRINCIPAL DISCHARGE DIAGNOSIS  Diagnosis: Shortness of breath  Assessment and Plan of Treatment: You have been admitted to the hospital because of shortness of breath.   A workup was done in the hospital including chest x ray, arterial blood gaz, cardiac echocardiogram, venous doppler of the lower extremities and was normal.   You have suspected COPD for this reason it is important that you follow up with a lung doctor as outpatient for pulmonary function tests and further recommendations. In the meantime avoid smoking and stay away from sick contacts   Your oxygen levels dropped on room air while you were ambulating and for this reason Home O2 has been set up for you.   Use the oxygen at all times while ambulating   Call your doctor earlier or report to the nearest emergency room if you have worsening shortness of breath, chest pain, if you have cough, wheezing, phlegm production or fever / chills or of you are unable to sleep on your back because you are short of breath      SECONDARY DISCHARGE DIAGNOSES  Diagnosis: Hypertension  Assessment and Plan of Treatment: Continue taking your blood pressure medication as prescribed   Measure your blood pressure every day at home in the morning, write down the values and present them to your doctor at your next visit   Call your doctor earlier if your blood pressure is higher than 180 / 120 and / or you have chest pain, headache, vision changes, shortness of breath   Avoid excessive salt in your diet

## 2019-04-23 NOTE — DISCHARGE NOTE PROVIDER - PROVIDER TOKENS
PROVIDER:[TOKEN:[[id number]],FOLLOWUP:[1 week]],PROVIDER:[TOKEN:[[id number]],FOLLOWUP:[1 week]]

## 2019-04-23 NOTE — DISCHARGE NOTE PROVIDER - NSDCCPTREATMENT_GEN_ALL_CORE_FT
PRINCIPAL PROCEDURE  Procedure: Transthoracic echo  Findings and Treatment: Left Ventricle: The left ventricular internal cavity size is normal. Left   ventricular wall thickness is normal. Global LV systolic function was   normal. Left ventricular ejection fraction, by visual estimation, is 60   to 65%. Indeterminate diastolic function.  Right Ventricle: Normal right ventricular size and function.  Left Atrium: Left atrial enlargement.  Right Atrium: Normal right atrial size.  Pericardium: There is no evidence of pericardial effusion.  Mitral Valve: Mild thickening of the anterior and posterior mitral   leaflets. Mild mitral regurgitation.  Tricuspid Valve: The tricuspid valve is normal in structure. Mild   tricuspid regurgitation.  Aortic Valve: The aorticvalve is trileaflet. No evidence of aortic   stenosis. No aortic regurgitation.  Pulmonic Valve: The pulmonic valve was not well visualized. Mild pulmonic   regurgitation.  Aorta: Aortic root measured at sinotubular junction is normal.        SECONDARY PROCEDURE  Procedure: Venous doppler lower extremity  Findings and Treatment: The common femoral, great saphenous, femoral, popliteal and small   saphenous veins were visualized bilaterally with no evidence of deep   venous thrombosis  All veins were fully compressible.  There was presence of spontaneous   flow, augmentation with distal compression and phasicity.  The anterior tibial veins were  patent   The posterior tibial veins were  patent    The peroneal veins were patent.      Procedure: XR chest AP  Findings and Treatment: Support devices: None.  Cardiac/mediastinum/hilum: Aortic calcifications. Enlarged cardiac   silhouette.  Lung parenchyma/Pleura: Within normal limits. Previously noted left upper   lobe opacity not discretely visualized.  Skeleton/soft tissues: Unremarkable.

## 2019-04-23 NOTE — DISCHARGE NOTE PROVIDER - HOSPITAL COURSE
Hospital course has been uncomplicated : BNP on admission was 1079, chest x ray was done on admission and showed no radiographic evidence of acute cardiopulmonary disease. Patient was continued on Lasix 40 daily. TTE was performed and showed normal systolic function and normal EF, with mild TR and MR. The patient was assessed on ambulation. at rest on room air she had a good SpO2 however on ambulation her saturation dropped to 85 % on room air, improved on O2 supplementation. The patient qualified for home O2 and O2 2L/min was set up for her. The workup was completed by normal ABG and a venous duplex negative for DVT and superficial thrombophlebitis. Laboratory studies were normal in the hospital and the patient was clinically and hemodynamically stable. Patient was discharged home with a cane and Home O2 and was told to follow up with a pulmonologist as outpatient for PFTs

## 2019-05-07 DIAGNOSIS — I10 ESSENTIAL (PRIMARY) HYPERTENSION: ICD-10-CM

## 2019-05-07 DIAGNOSIS — R06.02 SHORTNESS OF BREATH: ICD-10-CM

## 2019-05-07 DIAGNOSIS — J96.20 ACUTE AND CHRONIC RESPIRATORY FAILURE, UNSPECIFIED WHETHER WITH HYPOXIA OR HYPERCAPNIA: ICD-10-CM

## 2019-05-07 DIAGNOSIS — Z66 DO NOT RESUSCITATE: ICD-10-CM

## 2019-05-07 DIAGNOSIS — E11.9 TYPE 2 DIABETES MELLITUS WITHOUT COMPLICATIONS: ICD-10-CM

## 2019-05-07 DIAGNOSIS — J44.9 CHRONIC OBSTRUCTIVE PULMONARY DISEASE, UNSPECIFIED: ICD-10-CM

## 2019-05-07 DIAGNOSIS — E78.5 HYPERLIPIDEMIA, UNSPECIFIED: ICD-10-CM

## 2019-05-07 DIAGNOSIS — H35.30 UNSPECIFIED MACULAR DEGENERATION: ICD-10-CM

## 2019-05-07 DIAGNOSIS — H40.9 UNSPECIFIED GLAUCOMA: ICD-10-CM

## 2019-05-07 DIAGNOSIS — M15.9 POLYOSTEOARTHRITIS, UNSPECIFIED: ICD-10-CM

## 2020-11-30 ENCOUNTER — EMERGENCY (EMERGENCY)
Facility: HOSPITAL | Age: 85
LOS: 0 days | Discharge: HOME | End: 2020-11-30
Attending: EMERGENCY MEDICINE | Admitting: EMERGENCY MEDICINE
Payer: MEDICARE

## 2020-11-30 VITALS
SYSTOLIC BLOOD PRESSURE: 120 MMHG | HEART RATE: 70 BPM | DIASTOLIC BLOOD PRESSURE: 64 MMHG | OXYGEN SATURATION: 100 % | WEIGHT: 110.01 LBS | RESPIRATION RATE: 24 BRPM | TEMPERATURE: 99 F | HEIGHT: 56 IN

## 2020-11-30 DIAGNOSIS — Z79.01 LONG TERM (CURRENT) USE OF ANTICOAGULANTS: ICD-10-CM

## 2020-11-30 DIAGNOSIS — R53.83 OTHER FATIGUE: ICD-10-CM

## 2020-11-30 DIAGNOSIS — Z98.890 OTHER SPECIFIED POSTPROCEDURAL STATES: ICD-10-CM

## 2020-11-30 DIAGNOSIS — Z79.84 LONG TERM (CURRENT) USE OF ORAL HYPOGLYCEMIC DRUGS: ICD-10-CM

## 2020-11-30 DIAGNOSIS — Z79.899 OTHER LONG TERM (CURRENT) DRUG THERAPY: ICD-10-CM

## 2020-11-30 DIAGNOSIS — I10 ESSENTIAL (PRIMARY) HYPERTENSION: ICD-10-CM

## 2020-11-30 DIAGNOSIS — N39.0 URINARY TRACT INFECTION, SITE NOT SPECIFIED: ICD-10-CM

## 2020-11-30 DIAGNOSIS — Z98.890 OTHER SPECIFIED POSTPROCEDURAL STATES: Chronic | ICD-10-CM

## 2020-11-30 DIAGNOSIS — R41.0 DISORIENTATION, UNSPECIFIED: ICD-10-CM

## 2020-11-30 DIAGNOSIS — E11.9 TYPE 2 DIABETES MELLITUS WITHOUT COMPLICATIONS: ICD-10-CM

## 2020-11-30 LAB
ALBUMIN SERPL ELPH-MCNC: 4 G/DL — SIGNIFICANT CHANGE UP (ref 3.5–5.2)
ALP SERPL-CCNC: 228 U/L — HIGH (ref 30–115)
ALT FLD-CCNC: 31 U/L — SIGNIFICANT CHANGE UP (ref 0–41)
ANION GAP SERPL CALC-SCNC: 9 MMOL/L — SIGNIFICANT CHANGE UP (ref 7–14)
APPEARANCE UR: CLEAR — SIGNIFICANT CHANGE UP
AST SERPL-CCNC: 58 U/L — HIGH (ref 0–41)
BACTERIA # UR AUTO: ABNORMAL
BASOPHILS # BLD AUTO: 0.05 K/UL — SIGNIFICANT CHANGE UP (ref 0–0.2)
BASOPHILS NFR BLD AUTO: 0.7 % — SIGNIFICANT CHANGE UP (ref 0–1)
BILIRUB SERPL-MCNC: 0.6 MG/DL — SIGNIFICANT CHANGE UP (ref 0.2–1.2)
BILIRUB UR-MCNC: NEGATIVE — SIGNIFICANT CHANGE UP
BUN SERPL-MCNC: 13 MG/DL — SIGNIFICANT CHANGE UP (ref 10–20)
CALCIUM SERPL-MCNC: 8.9 MG/DL — SIGNIFICANT CHANGE UP (ref 8.5–10.1)
CHLORIDE SERPL-SCNC: 98 MMOL/L — SIGNIFICANT CHANGE UP (ref 98–110)
CO2 SERPL-SCNC: 31 MMOL/L — SIGNIFICANT CHANGE UP (ref 17–32)
COLOR SPEC: YELLOW — SIGNIFICANT CHANGE UP
CREAT SERPL-MCNC: 0.9 MG/DL — SIGNIFICANT CHANGE UP (ref 0.7–1.5)
DIFF PNL FLD: ABNORMAL
EOSINOPHIL # BLD AUTO: 0.16 K/UL — SIGNIFICANT CHANGE UP (ref 0–0.7)
EOSINOPHIL NFR BLD AUTO: 2.1 % — SIGNIFICANT CHANGE UP (ref 0–8)
EPI CELLS # UR: ABNORMAL /HPF
GLUCOSE SERPL-MCNC: 290 MG/DL — HIGH (ref 70–99)
GLUCOSE UR QL: 500 MG/DL
HCT VFR BLD CALC: 42.8 % — SIGNIFICANT CHANGE UP (ref 37–47)
HGB BLD-MCNC: 13.7 G/DL — SIGNIFICANT CHANGE UP (ref 12–16)
IMM GRANULOCYTES NFR BLD AUTO: 0.7 % — HIGH (ref 0.1–0.3)
KETONES UR-MCNC: NEGATIVE — SIGNIFICANT CHANGE UP
LEUKOCYTE ESTERASE UR-ACNC: ABNORMAL
LYMPHOCYTES # BLD AUTO: 2.52 K/UL — SIGNIFICANT CHANGE UP (ref 1.2–3.4)
LYMPHOCYTES # BLD AUTO: 32.9 % — SIGNIFICANT CHANGE UP (ref 20.5–51.1)
MCHC RBC-ENTMCNC: 28.4 PG — SIGNIFICANT CHANGE UP (ref 27–31)
MCHC RBC-ENTMCNC: 32 G/DL — SIGNIFICANT CHANGE UP (ref 32–37)
MCV RBC AUTO: 88.6 FL — SIGNIFICANT CHANGE UP (ref 81–99)
MONOCYTES # BLD AUTO: 0.54 K/UL — SIGNIFICANT CHANGE UP (ref 0.1–0.6)
MONOCYTES NFR BLD AUTO: 7 % — SIGNIFICANT CHANGE UP (ref 1.7–9.3)
NEUTROPHILS # BLD AUTO: 4.35 K/UL — SIGNIFICANT CHANGE UP (ref 1.4–6.5)
NEUTROPHILS NFR BLD AUTO: 56.6 % — SIGNIFICANT CHANGE UP (ref 42.2–75.2)
NITRITE UR-MCNC: NEGATIVE — SIGNIFICANT CHANGE UP
NRBC # BLD: 0 /100 WBCS — SIGNIFICANT CHANGE UP (ref 0–0)
PH UR: 6.5 — SIGNIFICANT CHANGE UP (ref 5–8)
PLATELET # BLD AUTO: 218 K/UL — SIGNIFICANT CHANGE UP (ref 130–400)
POTASSIUM SERPL-MCNC: 5.5 MMOL/L — HIGH (ref 3.5–5)
POTASSIUM SERPL-SCNC: 5.5 MMOL/L — HIGH (ref 3.5–5)
PROT SERPL-MCNC: 7.7 G/DL — SIGNIFICANT CHANGE UP (ref 6–8)
PROT UR-MCNC: ABNORMAL MG/DL
RBC # BLD: 4.83 M/UL — SIGNIFICANT CHANGE UP (ref 4.2–5.4)
RBC # FLD: 14.5 % — SIGNIFICANT CHANGE UP (ref 11.5–14.5)
RBC CASTS # UR COMP ASSIST: SIGNIFICANT CHANGE UP /HPF
SODIUM SERPL-SCNC: 138 MMOL/L — SIGNIFICANT CHANGE UP (ref 135–146)
SP GR SPEC: 1.02 — SIGNIFICANT CHANGE UP (ref 1.01–1.03)
TROPONIN T SERPL-MCNC: <0.01 NG/ML — SIGNIFICANT CHANGE UP
UROBILINOGEN FLD QL: 0.2 MG/DL — SIGNIFICANT CHANGE UP (ref 0.2–0.2)
WBC # BLD: 7.67 K/UL — SIGNIFICANT CHANGE UP (ref 4.8–10.8)
WBC # FLD AUTO: 7.67 K/UL — SIGNIFICANT CHANGE UP (ref 4.8–10.8)
WBC UR QL: ABNORMAL /HPF

## 2020-11-30 PROCEDURE — 99284 EMERGENCY DEPT VISIT MOD MDM: CPT

## 2020-11-30 PROCEDURE — 73070 X-RAY EXAM OF ELBOW: CPT | Mod: 26,RT

## 2020-11-30 PROCEDURE — 70450 CT HEAD/BRAIN W/O DYE: CPT | Mod: 26

## 2020-11-30 RX ORDER — PREGABALIN 225 MG/1
1 CAPSULE ORAL
Qty: 0 | Refills: 0 | DISCHARGE

## 2020-11-30 RX ORDER — PILOCARPINE HCL
0 CRYSTALS MISCELLANEOUS
Qty: 0 | Refills: 0 | DISCHARGE

## 2020-11-30 RX ORDER — VITAMIN E 100 UNIT
1 CAPSULE ORAL
Qty: 0 | Refills: 0 | DISCHARGE

## 2020-11-30 RX ORDER — ASCORBIC ACID 60 MG
1 TABLET,CHEWABLE ORAL
Qty: 0 | Refills: 0 | DISCHARGE

## 2020-11-30 RX ORDER — DORZOLAMIDE HYDROCHLORIDE TIMOLOL MALEATE 20; 5 MG/ML; MG/ML
0 SOLUTION/ DROPS OPHTHALMIC
Qty: 0 | Refills: 0 | DISCHARGE

## 2020-11-30 RX ORDER — CEFPODOXIME PROXETIL 100 MG
1 TABLET ORAL
Qty: 20 | Refills: 0
Start: 2020-11-30 | End: 2020-12-09

## 2020-11-30 RX ORDER — PROPRANOLOL HCL 160 MG
0 CAPSULE, EXTENDED RELEASE 24HR ORAL
Qty: 0 | Refills: 0 | DISCHARGE

## 2020-11-30 RX ORDER — TRAVOPROST 0.04 MG/ML
0 SOLUTION/ DROPS OPHTHALMIC
Qty: 0 | Refills: 0 | DISCHARGE

## 2020-11-30 RX ORDER — GLIMEPIRIDE 1 MG
1 TABLET ORAL
Qty: 0 | Refills: 0 | DISCHARGE

## 2020-11-30 RX ORDER — POTASSIUM CHLORIDE 20 MEQ
0 PACKET (EA) ORAL
Qty: 0 | Refills: 0 | DISCHARGE

## 2020-11-30 RX ORDER — AMLODIPINE BESYLATE AND BENAZEPRIL HYDROCHLORIDE 10; 20 MG/1; MG/1
1 CAPSULE ORAL
Qty: 0 | Refills: 0 | DISCHARGE

## 2020-11-30 NOTE — ED PROVIDER NOTE - CLINICAL SUMMARY MEDICAL DECISION MAKING FREE TEXT BOX
89yF hx of dementia  bib daughter  for worsening confusion -   typical for when she has  a uti -  in ed  labs reviewed, afebrile  wbc 7 la 1.3, + uti -  ct head no acute findings -  pt dcd with daughter who feels comfortable taking care of her  Patient to be discharged from ED. Any available test results were discussed with and printed  for patient.  Verbal instructions given, including instructions to return to ED immediately for any new, worsening, or concerning symptoms. Limitations of ED work up discussed.  Patient and daughter reports understanding of above with capacity and insight. Written discharge instructions additionally given, including follow-up plan.

## 2020-11-30 NOTE — ED PROVIDER NOTE - ATTENDING CONTRIBUTION TO CARE
VS noted.  Chest clear.  Heart RR no murmur.  abd NT.  Ext FROM.  =pulses. neuro intact.  O x 3. dx testing reviewed. d/w daughter several occasions.

## 2020-11-30 NOTE — ED PROVIDER NOTE - PATIENT PORTAL LINK FT
You can access the FollowMyHealth Patient Portal offered by [facility] by registering at the following website: [email or web address]. By joining [employer]’s FollowMyHealth portal, you will also be able to view your health information using other applications (apps) compatible with our system.

## 2020-11-30 NOTE — ED ADULT NURSE NOTE - NSIMPLEMENTINTERV_GEN_ALL_ED
Implemented All Fall with Harm Risk Interventions:  [place] to call system. Call bell, personal items and telephone within reach. Instruct patient to call for assistance. Room bathroom lighting operational. Non-slip footwear when patient is off stretcher. Physically safe environment: no spills, clutter or unnecessary equipment. Stretcher in lowest position, wheels locked, appropriate side rails in place. Provide visual cue, wrist band, yellow gown, etc. Monitor gait and stability. Monitor for mental status changes and reorient to person, place, and time. Review medications for side effects contributing to fall risk. Reinforce activity limits and safety measures with patient and family. Provide visual clues: red socks.

## 2020-11-30 NOTE — ED PROVIDER NOTE - OBJECTIVE STATEMENT
89 yo female, pmh of htn, dm on home NC, dementia, presents to ed with daughter for eval of lethargy. worse over last few days. denies fever, falls, vomiting.

## 2020-11-30 NOTE — ED PROVIDER NOTE - PSH
"  Physical Therapy Daily Note     [id number]    Therapy Diagnosis:   Encounter Diagnoses   Name Primary?   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  Pt went up not down      Pt was compliant with home exercise program.  Response to previous treatment:not bad  Functional change: very slight pain today with stairs    Pain:  No pain at rest slight pain with stairs  Location:  bilateral knees R>L 95% R    Objective     IT band tightness  mod tightness continues  Tape is holding and pt reports helped some.  Wound behind knee healing but not fully healed yet enough to tape with Calloway          TREATMENT    Zach received therapeutic exercises to develop strength, endurance, ROM, flexibility and core stabilization for 10 minutes including:  Able to climb stairs with no episodes     Pain so held minisquat x 20   (NP)Step up x 4" x 20 pain free zone,   (NP)Single leg stance 60 sec, with ball throw to pt  (NP) Wobble board x 10 B planes level 1 balance 1 min B planes  Leg press on shuttle x 3 springs x B LE x 2/10  Leg press on shuttle x R LE x 1.5 springs x 2/10  Pilates 2 spring Level 4 leg press x 20 " "standing on 2" board     Check on stretching and use of iron cross        Zach received the following manual therapy techniques: Soft tissue Mobilization and joint mob were applied to the: patella, IT band and peroneals for 20 minutes, including:  STM IT band, piriformis/gluteals with focus on lateral area bruce in area of greater trochanter and sacral area and mob to patella and STM medial patella where pt had trigger point pain    Pt received tool-assisted massage with manual therapy techniques to lateral leg R to trigger an inflammatory healing response and stimulate the production of new collagen and proper, more functional, less painful healing.     Vacuum/cupping STM with manual therapy techniques was performed to lateral leg R to decrease muscle tightness, increase circulation and promote healing process.  The pt's skin was monitored for redness adjusting pressure as needed. The pt was instructed in possible side effects of bruising and/or soreness.     Kinesiotape with 6" Y strip to surround patella and 2 10"  I strip along lateral patella for support and for pain relief was performed over the R knee.  Instructed pt in use, care and precautions with tape.      (NP)Elli patella taping was performed to the knee for lateral glide and tilt to improve patella tracking to decrease pain with quad contraction and with functional weight bearing activities.  Instructed pt in use, care and precautions with tape.             Pt received cold pack to knee for 10 min supine    Home Exercises Provided and Patient Education Provided     Education provided:   - HEP, KT taping use care and precautions    Written Home Exercises Provided no.  Exercises were reviewed and Zach was able to demonstrate them prior to the end of the session.  Zach demonstrated good  understanding of the education provided.     See EMR under Patient Instructions for exercises provided initial eval and 1/6/2020, 1-23-20    Assessment   Pt " H/O hernia repair     with less pain overall with stairs with KT tape.  Pt able to work on ex better and still limited with some CKC but will monitor over weekend with tape for will be doing walking in [place]  Zach is progressing well towards his goals.   Pt prognosis is Good.     Pt will continue to benefit from skilled outpatient physical therapy to address the deficits listed in the problem list box on initial evaluation, provide pt/family education and to maximize pt's level of independence in the home and community environment.     Pt's spiritual, cultural and educational needs considered and pt agreeable to plan of care and goals.     Anticipated barriers to physical therapy: none    GOALS:   Short Term Goals:  3 weeks MET STG's  Increase strength 1/2 muscle grade  Be able to perform HEP with minimal cueing required        Long Term Goals: 6 weeks progressing not met  Improve flexibility to min to 0 tightness  Improve muscle strength 1 muscle grade  Improve muscle strength with MMT to 4+/5 to 5/5  Restore ability to perform sit to stand transfer without increased pain  Restore ability to climb stairs in a reciprocal manner with min to 0 increased pain and/or difficulty  Restore ability to walk and stand without increased back pain    Plan   Continue with established plan of care towards PT goals.  Reconsider Calloway taping for next week  Progress CKC as tolerated

## 2020-11-30 NOTE — ED ADULT TRIAGE NOTE - CHIEF COMPLAINT QUOTE
BIB EMS for altered mental status, pt. poor historian, states "I guess I've been feeling short of breath". Denies pain. BIB EMS for altered mental status, pt. nancy historian, states "I guess I've been feeling short of breath". Denies pain. Per pt. daughter, called EMS "because she was so out of it." States pt. was "very confused" endorsed hallucinations to dtr and was observed talking to herself at night.

## 2020-12-01 LAB
CULTURE RESULTS: SIGNIFICANT CHANGE UP
SPECIMEN SOURCE: SIGNIFICANT CHANGE UP

## 2020-12-04 ENCOUNTER — INPATIENT (INPATIENT)
Facility: HOSPITAL | Age: 85
LOS: 1 days | Discharge: HOME HEALTH | End: 2020-12-06
Attending: STUDENT IN AN ORGANIZED HEALTH CARE EDUCATION/TRAINING PROGRAM | Admitting: STUDENT IN AN ORGANIZED HEALTH CARE EDUCATION/TRAINING PROGRAM
Payer: MEDICARE

## 2020-12-04 VITALS
HEART RATE: 64 BPM | RESPIRATION RATE: 16 BRPM | SYSTOLIC BLOOD PRESSURE: 139 MMHG | TEMPERATURE: 99 F | DIASTOLIC BLOOD PRESSURE: 65 MMHG | WEIGHT: 89.95 LBS | HEIGHT: 56 IN | OXYGEN SATURATION: 100 %

## 2020-12-04 DIAGNOSIS — Z98.890 OTHER SPECIFIED POSTPROCEDURAL STATES: Chronic | ICD-10-CM

## 2020-12-04 LAB
ALBUMIN SERPL ELPH-MCNC: 3.7 G/DL — SIGNIFICANT CHANGE UP (ref 3.5–5.2)
ALP SERPL-CCNC: 248 U/L — HIGH (ref 30–115)
ALT FLD-CCNC: 31 U/L — SIGNIFICANT CHANGE UP (ref 0–41)
ANION GAP SERPL CALC-SCNC: 4 MMOL/L — LOW (ref 7–14)
APPEARANCE UR: CLEAR — SIGNIFICANT CHANGE UP
AST SERPL-CCNC: 49 U/L — HIGH (ref 0–41)
BASOPHILS # BLD AUTO: 0.06 K/UL — SIGNIFICANT CHANGE UP (ref 0–0.2)
BASOPHILS NFR BLD AUTO: 0.9 % — SIGNIFICANT CHANGE UP (ref 0–1)
BILIRUB SERPL-MCNC: 0.4 MG/DL — SIGNIFICANT CHANGE UP (ref 0.2–1.2)
BILIRUB UR-MCNC: NEGATIVE — SIGNIFICANT CHANGE UP
BUN SERPL-MCNC: 11 MG/DL — SIGNIFICANT CHANGE UP (ref 10–20)
CALCIUM SERPL-MCNC: 8.9 MG/DL — SIGNIFICANT CHANGE UP (ref 8.5–10.1)
CHLORIDE SERPL-SCNC: 97 MMOL/L — LOW (ref 98–110)
CO2 SERPL-SCNC: 34 MMOL/L — HIGH (ref 17–32)
COLOR SPEC: YELLOW — SIGNIFICANT CHANGE UP
CREAT SERPL-MCNC: 0.9 MG/DL — SIGNIFICANT CHANGE UP (ref 0.7–1.5)
DIFF PNL FLD: NEGATIVE — SIGNIFICANT CHANGE UP
EOSINOPHIL # BLD AUTO: 0.14 K/UL — SIGNIFICANT CHANGE UP (ref 0–0.7)
EOSINOPHIL NFR BLD AUTO: 2.2 % — SIGNIFICANT CHANGE UP (ref 0–8)
GLUCOSE SERPL-MCNC: 302 MG/DL — HIGH (ref 70–99)
GLUCOSE UR QL: 500 MG/DL
HCT VFR BLD CALC: 41 % — SIGNIFICANT CHANGE UP (ref 37–47)
HGB BLD-MCNC: 13.1 G/DL — SIGNIFICANT CHANGE UP (ref 12–16)
IMM GRANULOCYTES NFR BLD AUTO: 0.6 % — HIGH (ref 0.1–0.3)
KETONES UR-MCNC: NEGATIVE — SIGNIFICANT CHANGE UP
LACTATE SERPL-SCNC: 0.9 MMOL/L — SIGNIFICANT CHANGE UP (ref 0.7–2)
LEUKOCYTE ESTERASE UR-ACNC: NEGATIVE — SIGNIFICANT CHANGE UP
LYMPHOCYTES # BLD AUTO: 1.95 K/UL — SIGNIFICANT CHANGE UP (ref 1.2–3.4)
LYMPHOCYTES # BLD AUTO: 30.9 % — SIGNIFICANT CHANGE UP (ref 20.5–51.1)
MAGNESIUM SERPL-MCNC: 2 MG/DL — SIGNIFICANT CHANGE UP (ref 1.8–2.4)
MCHC RBC-ENTMCNC: 28.1 PG — SIGNIFICANT CHANGE UP (ref 27–31)
MCHC RBC-ENTMCNC: 32 G/DL — SIGNIFICANT CHANGE UP (ref 32–37)
MCV RBC AUTO: 87.8 FL — SIGNIFICANT CHANGE UP (ref 81–99)
MONOCYTES # BLD AUTO: 0.51 K/UL — SIGNIFICANT CHANGE UP (ref 0.1–0.6)
MONOCYTES NFR BLD AUTO: 8.1 % — SIGNIFICANT CHANGE UP (ref 1.7–9.3)
NEUTROPHILS # BLD AUTO: 3.62 K/UL — SIGNIFICANT CHANGE UP (ref 1.4–6.5)
NEUTROPHILS NFR BLD AUTO: 57.3 % — SIGNIFICANT CHANGE UP (ref 42.2–75.2)
NITRITE UR-MCNC: NEGATIVE — SIGNIFICANT CHANGE UP
NRBC # BLD: 0 /100 WBCS — SIGNIFICANT CHANGE UP (ref 0–0)
PH UR: 7 — SIGNIFICANT CHANGE UP (ref 5–8)
PLATELET # BLD AUTO: 223 K/UL — SIGNIFICANT CHANGE UP (ref 130–400)
POTASSIUM SERPL-MCNC: 4.9 MMOL/L — SIGNIFICANT CHANGE UP (ref 3.5–5)
POTASSIUM SERPL-SCNC: 4.9 MMOL/L — SIGNIFICANT CHANGE UP (ref 3.5–5)
PROT SERPL-MCNC: 6.7 G/DL — SIGNIFICANT CHANGE UP (ref 6–8)
PROT UR-MCNC: NEGATIVE MG/DL — SIGNIFICANT CHANGE UP
RAPID RVP RESULT: SIGNIFICANT CHANGE UP
RBC # BLD: 4.67 M/UL — SIGNIFICANT CHANGE UP (ref 4.2–5.4)
RBC # FLD: 14.5 % — SIGNIFICANT CHANGE UP (ref 11.5–14.5)
SARS-COV-2 RNA SPEC QL NAA+PROBE: SIGNIFICANT CHANGE UP
SODIUM SERPL-SCNC: 135 MMOL/L — SIGNIFICANT CHANGE UP (ref 135–146)
SP GR SPEC: 1.02 — SIGNIFICANT CHANGE UP (ref 1.01–1.03)
UROBILINOGEN FLD QL: 0.2 MG/DL — SIGNIFICANT CHANGE UP (ref 0.2–0.2)
WBC # BLD: 6.32 K/UL — SIGNIFICANT CHANGE UP (ref 4.8–10.8)
WBC # FLD AUTO: 6.32 K/UL — SIGNIFICANT CHANGE UP (ref 4.8–10.8)

## 2020-12-04 PROCEDURE — 99285 EMERGENCY DEPT VISIT HI MDM: CPT | Mod: CS

## 2020-12-04 PROCEDURE — 99223 1ST HOSP IP/OBS HIGH 75: CPT

## 2020-12-04 PROCEDURE — 99497 ADVNCD CARE PLAN 30 MIN: CPT | Mod: 25

## 2020-12-04 PROCEDURE — 71045 X-RAY EXAM CHEST 1 VIEW: CPT | Mod: 26

## 2020-12-04 RX ORDER — DORZOLAMIDE HYDROCHLORIDE TIMOLOL MALEATE 20; 5 MG/ML; MG/ML
1 SOLUTION/ DROPS OPHTHALMIC
Refills: 0 | Status: DISCONTINUED | OUTPATIENT
Start: 2020-12-04 | End: 2020-12-04

## 2020-12-04 RX ORDER — TIMOLOL 0.5 %
1 DROPS OPHTHALMIC (EYE)
Refills: 0 | Status: DISCONTINUED | OUTPATIENT
Start: 2020-12-04 | End: 2020-12-06

## 2020-12-04 RX ORDER — SODIUM CHLORIDE 9 MG/ML
1000 INJECTION, SOLUTION INTRAVENOUS ONCE
Refills: 0 | Status: COMPLETED | OUTPATIENT
Start: 2020-12-04 | End: 2020-12-04

## 2020-12-04 RX ORDER — ASCORBIC ACID 60 MG
500 TABLET,CHEWABLE ORAL DAILY
Refills: 0 | Status: DISCONTINUED | OUTPATIENT
Start: 2020-12-04 | End: 2020-12-06

## 2020-12-04 RX ORDER — PREGABALIN 225 MG/1
1000 CAPSULE ORAL DAILY
Refills: 0 | Status: DISCONTINUED | OUTPATIENT
Start: 2020-12-04 | End: 2020-12-05

## 2020-12-04 RX ORDER — TIMOLOL 0.5 %
1 DROPS OPHTHALMIC (EYE)
Refills: 0 | Status: DISCONTINUED | OUTPATIENT
Start: 2020-12-04 | End: 2020-12-04

## 2020-12-04 RX ORDER — CEFPODOXIME PROXETIL 100 MG
200 TABLET ORAL EVERY 12 HOURS
Refills: 0 | Status: DISCONTINUED | OUTPATIENT
Start: 2020-12-04 | End: 2020-12-04

## 2020-12-04 RX ORDER — DORZOLAMIDE HYDROCHLORIDE 20 MG/ML
1 SOLUTION/ DROPS OPHTHALMIC THREE TIMES A DAY
Refills: 0 | Status: DISCONTINUED | OUTPATIENT
Start: 2020-12-04 | End: 2020-12-06

## 2020-12-04 RX ORDER — LATANOPROST 0.05 MG/ML
1 SOLUTION/ DROPS OPHTHALMIC; TOPICAL AT BEDTIME
Refills: 0 | Status: DISCONTINUED | OUTPATIENT
Start: 2020-12-04 | End: 2020-12-06

## 2020-12-04 RX ORDER — CEFPODOXIME PROXETIL 100 MG
100 TABLET ORAL EVERY 12 HOURS
Refills: 0 | Status: DISCONTINUED | OUTPATIENT
Start: 2020-12-04 | End: 2020-12-06

## 2020-12-04 RX ORDER — PILOCARPINE HCL 4 %
1 DROPS OPHTHALMIC (EYE) DAILY
Refills: 0 | Status: DISCONTINUED | OUTPATIENT
Start: 2020-12-04 | End: 2020-12-06

## 2020-12-04 RX ORDER — FUROSEMIDE 40 MG
20 TABLET ORAL DAILY
Refills: 0 | Status: DISCONTINUED | OUTPATIENT
Start: 2020-12-04 | End: 2020-12-06

## 2020-12-04 RX ORDER — AMLODIPINE BESYLATE 2.5 MG/1
5 TABLET ORAL DAILY
Refills: 0 | Status: DISCONTINUED | OUTPATIENT
Start: 2020-12-04 | End: 2020-12-06

## 2020-12-04 RX ORDER — VITAMIN E 100 UNIT
200 CAPSULE ORAL DAILY
Refills: 0 | Status: DISCONTINUED | OUTPATIENT
Start: 2020-12-04 | End: 2020-12-06

## 2020-12-04 RX ORDER — LISINOPRIL 2.5 MG/1
10 TABLET ORAL DAILY
Refills: 0 | Status: DISCONTINUED | OUTPATIENT
Start: 2020-12-04 | End: 2020-12-06

## 2020-12-04 RX ORDER — POTASSIUM CHLORIDE 20 MEQ
20 PACKET (EA) ORAL DAILY
Refills: 0 | Status: DISCONTINUED | OUTPATIENT
Start: 2020-12-04 | End: 2020-12-06

## 2020-12-04 RX ADMIN — SODIUM CHLORIDE 1000 MILLILITER(S): 9 INJECTION, SOLUTION INTRAVENOUS at 21:08

## 2020-12-04 RX ADMIN — LATANOPROST 1 DROP(S): 0.05 SOLUTION/ DROPS OPHTHALMIC; TOPICAL at 23:00

## 2020-12-04 RX ADMIN — SODIUM CHLORIDE 1000 MILLILITER(S): 9 INJECTION, SOLUTION INTRAVENOUS at 19:40

## 2020-12-04 NOTE — H&P ADULT - NSHPREVIEWOFSYSTEMS_GEN_ALL_CORE
Patient is a poor historian. History and med recon taken daughter over phone.    89 year old female with PMH dementia, DM, Diverticulosis, HTN, DM was brought in by family for confusion. As per pt delirium. Pt seen in ED (4) days prior for lethargy, dx with UTI placed on cefpodoxime, pt compliant. As per family pt having episodes of delirium for past week, PMD wants re-evaluated in ED. Pt denies any fever, chills, bodyaches, chest pain, sob, abdominal pain, NVCD, dysuria

## 2020-12-04 NOTE — H&P ADULT - NSHPPHYSICALEXAM_GEN_ALL_CORE
T(C): 36.3 (12-04-20 @ 22:56), Max: 37.1 (12-04-20 @ 18:53)  HR: 69 (12-04-20 @ 22:56) (64 - 69)  BP: 171/72 (12-04-20 @ 22:56) (136/67 - 171/72)  RR: 16 (12-04-20 @ 22:56) (16 - 16)  SpO2: 100% (12-04-20 @ 22:17) (100% - 100%)        GENERAL: NAD, frail  HEAD:  Atraumatic, Normocephalic  EYES: EOMI, PERRLA, conjunctiva and sclera clear  ENT: Normal tympanic membrane. No nasal obstruction or discharge. No tonsillar exudate, swelling or erythema.  NECK: Supple, No JVD  CHEST/LUNG: Clear to auscultation bilaterally; No wheeze  HEART: Regular rate and rhythm; No murmurs, rubs, or gallops  ABDOMEN: Soft, Nontender, Nondistended; Bowel sounds present  EXTREMITIES:  2+ Peripheral Pulses, No clubbing, cyanosis, or edema  PSYCH: AAOx3  NEUROLOGY: non-focal  SKIN: No rashes or lesions T(C): 36.3 (12-04-20 @ 22:56), Max: 37.1 (12-04-20 @ 18:53)  HR: 69 (12-04-20 @ 22:56) (64 - 69)  BP: 171/72 (12-04-20 @ 22:56) (136/67 - 171/72)  RR: 16 (12-04-20 @ 22:56) (16 - 16)  SpO2: 100% (12-04-20 @ 22:17) (100% - 100%)      GENERAL: NAD, frail, dry mucous membrane  HEAD:  Atraumatic, Normocephalic  EYES: EOMI, PERRLA, conjunctiva and sclera clear  ENT: Normal tympanic membrane. No nasal obstruction or discharge. No tonsillar exudate, swelling or erythema.  NECK: Supple, No JVD  CHEST/LUNG: Clear to auscultation bilaterally; No wheeze  HEART: Regular rate and rhythm; systolic murmur, no rubs, or gallops  ABDOMEN: Soft, Nontender, Nondistended; Bowel sounds present  EXTREMITIES:  2+ Peripheral Pulses, No clubbing, cyanosis, or edema  PSYCH: AAOx1  NEUROLOGY: non-focal  SKIN: No rashes or lesions

## 2020-12-04 NOTE — H&P ADULT - CONVERSATION DETAILS
Spoke with HCP regarding living will and what pt wishes would be if her heart were to stop beating or her respiratory status worsened. Daughter states at this point she would not want all life sustaining methods to be carried out and therefore remain DNR / DNI

## 2020-12-04 NOTE — H&P ADULT - ATTENDING COMMENTS
89 year old female with PMH dementia, DM, Diverticulosis, HTN, DM presents to ED brought in by family for confusion.     # Encephalopathy secondaryC to infection vs Progression on Dementia  - hemodynamically stable   - blood work unremarkable; UA negative for infections, no signs of sepsis   - CXR: no evidence of acute cardiopul disease; (+) small left pleural effusion  - was treated for UTI 4 days ago and discharged on cefpodoxime  - ambulate as tolerated  - fall precaution   - PT consult     # Chronic HFpEF  - euvolemic on exam   - ECHO (04/2019): normal systolic function and normal EF, with mild TR and MR  - c/w Lasix     # Chronic respiratory failure   - on home O2 prn    # HTN  - c/w Amlodipine  and Lisinopril    # DM  - monitor FS  - start sliding scale insulin if FS >180    # DVT prophylaxis : Lovenox     # Diet : consistent carbohydrate     # Full code  - verify code status in am 89 year old female with PMH dementia, DM, Diverticulosis, HTN, DM admitted for confusion.    # Delirium secondary to infection vs Progression on Dementia vs polypharmacy   - hemodynamically stable   - blood work unremarkable; UA negative for infections, no signs of sepsis   - CXR: no evidence of acute cardiopul disease; (+) small left pleural effusion  - treated for UTI 4 days ago and discharged from ER on Vantin  - c/w vantin for 5 more day to complete abx course  - ambulate as tolerated  - fall precaution   - PT consult   - Neurology consult     # Chronic HFpEF  - euvolemic on exam   - ECHO (04/2019): normal systolic function and normal EF, with mild TR and MR  - c/w Lasix and propanolol     # Chronic respiratory failure   - on home O2 (24/7)    # HTN  - c/w Amlodipine, Lisinopril and lasix     # DM  - monitor FS  - start lispro 3U+3U+3U (sliding scale)    # DVT prophylaxis  - start heparin    # DASH diet, CHO consistent     # DNR/DNI  - healthcare proxy: Teressa (daughter): [phone] 89 year old female with PMH dementia, DM, Diverticulosis, HTN, DM, HFpEF, ch resp failure on 24/7 2L NC admitted for confusion.    # Delirium secondary to infection vs Progression on Dementia vs polypharmacy   - hemodynamically stable   - blood work unremarkable; UA negative for infections, no signs of sepsis   - CXR: no evidence of acute cardiopul disease; (+) small left pleural effusion  - treated for UTI 4 days ago and discharged from ER on Vantin  - c/w vantin for 5 more day to complete abx course  - ambulate as tolerated  - fall precaution   - PT consult   - Neurology consult     # Chronic HFpEF  - euvolemic on exam   - ECHO (04/2019): normal systolic function and normal EF, with mild TR and MR  - c/w Lasix and propanolol     # Chronic respiratory failure   - on home O2 (24/7)    # HTN  - c/w Amlodipine, Lisinopril and lasix     # DM  - monitor FS  - start lispro 3U+3U+3U (sliding scale)    # DVT prophylaxis  - start heparin    # DASH diet, CHO consistent     # DNR/DNI  - healthcare proxy: Teressa (daughter): [phone]

## 2020-12-04 NOTE — PATIENT PROFILE ADULT - ABILITY TO HEAR (WITH HEARING AID OR HEARING APPLIANCE IF NORMALLY USED):
pt does not have hearing aids/Mildly to Moderately Impaired: difficulty hearing in some environments or speaker may need to increase volume or speak distinctly

## 2020-12-04 NOTE — ED ADULT NURSE NOTE - NSIMPLEMENTINTERV_GEN_ALL_ED
Implemented All Fall with Harm Risk Interventions:  Blanket to call system. Call bell, personal items and telephone within reach. Instruct patient to call for assistance. Room bathroom lighting operational. Non-slip footwear when patient is off stretcher. Physically safe environment: no spills, clutter or unnecessary equipment. Stretcher in lowest position, wheels locked, appropriate side rails in place. Provide visual cue, wrist band, yellow gown, etc. Monitor gait and stability. Monitor for mental status changes and reorient to person, place, and time. Review medications for side effects contributing to fall risk. Reinforce activity limits and safety measures with patient and family. Provide visual clues: red socks.

## 2020-12-04 NOTE — H&P ADULT - NSHPSOCIALHISTORY_GEN_ALL_CORE
denies all ( non smoker or drinker) Marital Status:  (   )    (   ) Single    (   )    ( x )   Lives with: (  ) alone  ( x ) children   (  ) spouse   (  ) parents  (  ) other  Recent Travel: No recent travel    Substance Use (street drugs): ( x ) never used  (  ) other:  Tobacco Usage:  ( x  ) never smoked   (   ) former smoker   (   ) current smoker  (     ) pack year  Alcohol Usage: None

## 2020-12-04 NOTE — ED PROVIDER NOTE - OBJECTIVE STATEMENT
Pt is a 89 year old female with PMH dementia, DM, Diverticulosis, HTN, DM presents to ED brought in by family for delirium. Pt seen in ED (4) days prior for lethargy, dx with UTI placed on cefpodoxime, pt compliant. As per family pt having episodes of delirium for past week, PMD wants re-evaluated in ED. Pt denies any fever, chills, bodyaches, chest pain, sob, abdominal pain, NVCD, dysuria

## 2020-12-04 NOTE — ED PROVIDER NOTE - CLINICAL SUMMARY MEDICAL DECISION MAKING FREE TEXT BOX
89 yo F, hx of mild dementia, DM II, diverticulitis, HTN, DM II, diagnosed with UTI 4 days ago, on vantin, brought in by her daughter for assessment of nightly hallucinations/delirium x 1 week -- Since last Friday has had nightly episodes of seeing dead family members, seeing writing on the walls, insects that are not there, not sleeping, attempting to walk all over the house. Per daughter this is very new and her previous "dementia" consisted only of short term memory loss. No recent fever, chills. No missed medication/abx doses. No recent trauma and had unremarkable head CT on previous visit.    VS normal, patient is AAOx3, clear lungs, RRR, soft, NT, nd abdomen.   Despite normal exam at this time, based on her daughter's description, patient appears to be experiencing nightly AMS -- could be sundowning vs delirium. Labs and exam do not support sepsis as etiology.     Will admit for further work up of acute delirium.

## 2020-12-04 NOTE — H&P ADULT - NSHPLABSRESULTS_GEN_ALL_CORE
- CXR: no evidence of acute cardiopul disease; (+) small left pleural effusion CXR: no evidence of acute cardiopul disease; (+) small left pleural effusion    CT Head No Cont (11.30.20):   Parenchymal volume loss compatible withthe patient's age  Patchy areas of hypodensity in the periventricular and subcortical white matter, consistent with chronic microvascular ischemic disease.  No CT evidence of acute fracture, intracranial hemorrhage, midline shift, or mass effect.  There is clouding of multiple mastoid air cells bilaterally consistent with mastoiditis.

## 2020-12-04 NOTE — ED ADULT TRIAGE NOTE - CHIEF COMPLAINT QUOTE
pt BIBA, states pt has UTI, was seen in ED a few days ago and d/c home with ABT, today daughter reports pt is more confused than usual, pt AAOX3 in triage

## 2020-12-04 NOTE — H&P ADULT - HISTORY OF PRESENT ILLNESS
Patient is a poor historian. History taken from ER notes.     89 year old female with PMH dementia, DM, Diverticulosis, HTN, DM presents to ED brought in by family for confusion. As per pt delirium. Pt seen in ED (4) days prior for lethargy, dx with UTI placed on cefpodoxime, pt compliant. As per family pt having episodes of delirium for past week, PMD wants re-evaluated in ED. Pt denies any fever, chills, bodyaches, chest pain, sob, abdominal pain, NVCD, dysuria   Patient is a poor historian. History and med recon taken daughter over phone.    89 year old female with PMH dementia, DM, Diverticulosis, HTN, DM was brought in by family for confusion. As per daughter, since last Friday patient has been confused with auditory and visual hallucinations. Family brought patient to ER last Monday and was diagnosed with UTI and sent home on Vantin. However, her symptoms did not improve. Yesterday, patient stayed up all night talking alone and seeing people come into her room. Today patient was lethargic, minimally responsive so family brought pt to ER for further evaluation.     At baseline, patient is AAOx2-3, lives home with daughter, unsteady gait (refuses to use walker at home), needs assistance with ALS, social negx3. Patient is a poor historian. History and med recon taken daughter over phone.    89 year old female with PMH dementia, DM, Diverticulosis, HTN, DM, HFpEF, ch resp failure on 24/7 2L NC ( was brought in by family for confusion. As per daughter, since last Friday patient has been confused with auditory and visual hallucinations. Family brought patient to ER last Monday and was diagnosed with UTI and sent home on Vantin. However, her symptoms did not improve. Yesterday, patient stayed up all night talking alone and seeing people come into her room. Today patient was lethargic, minimally responsive so family brought pt to ER for further evaluation.     At baseline, patient is AAOx2-3, lives home with daughter, unsteady gait (refuses to use walker at home), needs assistance with ALS, social negx3.

## 2020-12-05 DIAGNOSIS — R41.0 DISORIENTATION, UNSPECIFIED: ICD-10-CM

## 2020-12-05 LAB
ANION GAP SERPL CALC-SCNC: 4 MMOL/L — LOW (ref 7–14)
BASOPHILS # BLD AUTO: 0.05 K/UL — SIGNIFICANT CHANGE UP (ref 0–0.2)
BASOPHILS NFR BLD AUTO: 0.7 % — SIGNIFICANT CHANGE UP (ref 0–1)
BUN SERPL-MCNC: 10 MG/DL — SIGNIFICANT CHANGE UP (ref 10–20)
CALCIUM SERPL-MCNC: 8.7 MG/DL — SIGNIFICANT CHANGE UP (ref 8.5–10.1)
CHLORIDE SERPL-SCNC: 97 MMOL/L — LOW (ref 98–110)
CO2 SERPL-SCNC: 36 MMOL/L — HIGH (ref 17–32)
CREAT SERPL-MCNC: 0.7 MG/DL — SIGNIFICANT CHANGE UP (ref 0.7–1.5)
CULTURE RESULTS: NO GROWTH — SIGNIFICANT CHANGE UP
EOSINOPHIL # BLD AUTO: 0.16 K/UL — SIGNIFICANT CHANGE UP (ref 0–0.7)
EOSINOPHIL NFR BLD AUTO: 2.1 % — SIGNIFICANT CHANGE UP (ref 0–8)
FOLATE SERPL-MCNC: >20 NG/ML — SIGNIFICANT CHANGE UP
GLUCOSE BLDC GLUCOMTR-MCNC: 185 MG/DL — HIGH (ref 70–99)
GLUCOSE BLDC GLUCOMTR-MCNC: 199 MG/DL — HIGH (ref 70–99)
GLUCOSE BLDC GLUCOMTR-MCNC: 352 MG/DL — HIGH (ref 70–99)
GLUCOSE BLDC GLUCOMTR-MCNC: 77 MG/DL — SIGNIFICANT CHANGE UP (ref 70–99)
GLUCOSE SERPL-MCNC: 197 MG/DL — HIGH (ref 70–99)
HCT VFR BLD CALC: 38.8 % — SIGNIFICANT CHANGE UP (ref 37–47)
HGB BLD-MCNC: 12.5 G/DL — SIGNIFICANT CHANGE UP (ref 12–16)
IMM GRANULOCYTES NFR BLD AUTO: 0.7 % — HIGH (ref 0.1–0.3)
LYMPHOCYTES # BLD AUTO: 2.33 K/UL — SIGNIFICANT CHANGE UP (ref 1.2–3.4)
LYMPHOCYTES # BLD AUTO: 30.4 % — SIGNIFICANT CHANGE UP (ref 20.5–51.1)
MAGNESIUM SERPL-MCNC: 1.9 MG/DL — SIGNIFICANT CHANGE UP (ref 1.8–2.4)
MCHC RBC-ENTMCNC: 28.7 PG — SIGNIFICANT CHANGE UP (ref 27–31)
MCHC RBC-ENTMCNC: 32.2 G/DL — SIGNIFICANT CHANGE UP (ref 32–37)
MCV RBC AUTO: 89 FL — SIGNIFICANT CHANGE UP (ref 81–99)
MONOCYTES # BLD AUTO: 0.7 K/UL — HIGH (ref 0.1–0.6)
MONOCYTES NFR BLD AUTO: 9.1 % — SIGNIFICANT CHANGE UP (ref 1.7–9.3)
NEUTROPHILS # BLD AUTO: 4.38 K/UL — SIGNIFICANT CHANGE UP (ref 1.4–6.5)
NEUTROPHILS NFR BLD AUTO: 57 % — SIGNIFICANT CHANGE UP (ref 42.2–75.2)
NRBC # BLD: 0 /100 WBCS — SIGNIFICANT CHANGE UP (ref 0–0)
PLATELET # BLD AUTO: 213 K/UL — SIGNIFICANT CHANGE UP (ref 130–400)
POTASSIUM SERPL-MCNC: 3.9 MMOL/L — SIGNIFICANT CHANGE UP (ref 3.5–5)
POTASSIUM SERPL-SCNC: 3.9 MMOL/L — SIGNIFICANT CHANGE UP (ref 3.5–5)
RBC # BLD: 4.36 M/UL — SIGNIFICANT CHANGE UP (ref 4.2–5.4)
RBC # FLD: 14.3 % — SIGNIFICANT CHANGE UP (ref 11.5–14.5)
SODIUM SERPL-SCNC: 137 MMOL/L — SIGNIFICANT CHANGE UP (ref 135–146)
SPECIMEN SOURCE: SIGNIFICANT CHANGE UP
TSH SERPL-MCNC: 1.33 UIU/ML — SIGNIFICANT CHANGE UP (ref 0.27–4.2)
VIT B12 SERPL-MCNC: >2000 PG/ML — HIGH (ref 232–1245)
WBC # BLD: 7.67 K/UL — SIGNIFICANT CHANGE UP (ref 4.8–10.8)
WBC # FLD AUTO: 7.67 K/UL — SIGNIFICANT CHANGE UP (ref 4.8–10.8)

## 2020-12-05 PROCEDURE — 76705 ECHO EXAM OF ABDOMEN: CPT | Mod: 26

## 2020-12-05 PROCEDURE — 99221 1ST HOSP IP/OBS SF/LOW 40: CPT

## 2020-12-05 PROCEDURE — 99222 1ST HOSP IP/OBS MODERATE 55: CPT

## 2020-12-05 RX ORDER — INSULIN LISPRO 100/ML
5 VIAL (ML) SUBCUTANEOUS ONCE
Refills: 0 | Status: COMPLETED | OUTPATIENT
Start: 2020-12-05 | End: 2020-12-05

## 2020-12-05 RX ORDER — INSULIN LISPRO 100/ML
3 VIAL (ML) SUBCUTANEOUS
Refills: 0 | Status: DISCONTINUED | OUTPATIENT
Start: 2020-12-05 | End: 2020-12-06

## 2020-12-05 RX ORDER — DEXTROSE 50 % IN WATER 50 %
25 SYRINGE (ML) INTRAVENOUS ONCE
Refills: 0 | Status: DISCONTINUED | OUTPATIENT
Start: 2020-12-05 | End: 2020-12-06

## 2020-12-05 RX ORDER — SODIUM CHLORIDE 9 MG/ML
1000 INJECTION, SOLUTION INTRAVENOUS
Refills: 0 | Status: DISCONTINUED | OUTPATIENT
Start: 2020-12-05 | End: 2020-12-06

## 2020-12-05 RX ORDER — GLUCAGON INJECTION, SOLUTION 0.5 MG/.1ML
1 INJECTION, SOLUTION SUBCUTANEOUS ONCE
Refills: 0 | Status: DISCONTINUED | OUTPATIENT
Start: 2020-12-05 | End: 2020-12-06

## 2020-12-05 RX ORDER — DEXTROSE 50 % IN WATER 50 %
15 SYRINGE (ML) INTRAVENOUS ONCE
Refills: 0 | Status: DISCONTINUED | OUTPATIENT
Start: 2020-12-05 | End: 2020-12-06

## 2020-12-05 RX ORDER — HEPARIN SODIUM 5000 [USP'U]/ML
5000 INJECTION INTRAVENOUS; SUBCUTANEOUS EVERY 12 HOURS
Refills: 0 | Status: DISCONTINUED | OUTPATIENT
Start: 2020-12-05 | End: 2020-12-06

## 2020-12-05 RX ORDER — SODIUM CHLORIDE 9 MG/ML
250 INJECTION INTRAMUSCULAR; INTRAVENOUS; SUBCUTANEOUS ONCE
Refills: 0 | Status: COMPLETED | OUTPATIENT
Start: 2020-12-05 | End: 2020-12-05

## 2020-12-05 RX ORDER — INSULIN LISPRO 100/ML
VIAL (ML) SUBCUTANEOUS
Refills: 0 | Status: DISCONTINUED | OUTPATIENT
Start: 2020-12-05 | End: 2020-12-06

## 2020-12-05 RX ORDER — DEXTROSE 50 % IN WATER 50 %
12.5 SYRINGE (ML) INTRAVENOUS ONCE
Refills: 0 | Status: DISCONTINUED | OUTPATIENT
Start: 2020-12-05 | End: 2020-12-06

## 2020-12-05 RX ORDER — INSULIN GLARGINE 100 [IU]/ML
5 INJECTION, SOLUTION SUBCUTANEOUS EVERY MORNING
Refills: 0 | Status: DISCONTINUED | OUTPATIENT
Start: 2020-12-05 | End: 2020-12-06

## 2020-12-05 RX ADMIN — Medication 1 DROP(S): at 11:52

## 2020-12-05 RX ADMIN — Medication 100 MILLIGRAM(S): at 17:11

## 2020-12-05 RX ADMIN — Medication 500 MILLIGRAM(S): at 11:51

## 2020-12-05 RX ADMIN — DORZOLAMIDE HYDROCHLORIDE 1 DROP(S): 20 SOLUTION/ DROPS OPHTHALMIC at 21:16

## 2020-12-05 RX ADMIN — AMLODIPINE BESYLATE 5 MILLIGRAM(S): 2.5 TABLET ORAL at 05:39

## 2020-12-05 RX ADMIN — Medication 1: at 11:49

## 2020-12-05 RX ADMIN — Medication 1 TABLET(S): at 11:51

## 2020-12-05 RX ADMIN — DORZOLAMIDE HYDROCHLORIDE 1 DROP(S): 20 SOLUTION/ DROPS OPHTHALMIC at 05:41

## 2020-12-05 RX ADMIN — Medication 1: at 07:56

## 2020-12-05 RX ADMIN — Medication 20 MILLIGRAM(S): at 11:51

## 2020-12-05 RX ADMIN — Medication 3 UNIT(S): at 11:49

## 2020-12-05 RX ADMIN — Medication 20 MILLIGRAM(S): at 05:40

## 2020-12-05 RX ADMIN — Medication 1 DROP(S): at 05:40

## 2020-12-05 RX ADMIN — DORZOLAMIDE HYDROCHLORIDE 1 DROP(S): 20 SOLUTION/ DROPS OPHTHALMIC at 14:56

## 2020-12-05 RX ADMIN — Medication 100 MILLIGRAM(S): at 05:40

## 2020-12-05 RX ADMIN — Medication 3 UNIT(S): at 07:56

## 2020-12-05 RX ADMIN — HEPARIN SODIUM 5000 UNIT(S): 5000 INJECTION INTRAVENOUS; SUBCUTANEOUS at 17:11

## 2020-12-05 RX ADMIN — LATANOPROST 1 DROP(S): 0.05 SOLUTION/ DROPS OPHTHALMIC; TOPICAL at 21:16

## 2020-12-05 RX ADMIN — LISINOPRIL 10 MILLIGRAM(S): 2.5 TABLET ORAL at 05:40

## 2020-12-05 RX ADMIN — Medication 20 MILLIEQUIVALENT(S): at 11:53

## 2020-12-05 RX ADMIN — Medication 200 INTERNATIONAL UNIT(S): at 11:51

## 2020-12-05 RX ADMIN — SODIUM CHLORIDE 250 MILLILITER(S): 9 INJECTION INTRAMUSCULAR; INTRAVENOUS; SUBCUTANEOUS at 05:20

## 2020-12-05 RX ADMIN — Medication 1 DROP(S): at 17:11

## 2020-12-05 RX ADMIN — Medication 5 UNIT(S): at 21:55

## 2020-12-05 NOTE — CONSULT NOTE ADULT - SUBJECTIVE AND OBJECTIVE BOX
CC: "I am OK. I probably passed out at home"    HPI: 89 year old W with PMH of DM, Diverticulosis, HTN, DM, HFpEF, ch resp failure on 24/7 2L NC ( was brought in by family for confusion and admitted to 4S on 12/04. As per her daughter Amaya who lives with her (phone [phone]), since Friday 11/27/20 patient has been confused with auditory and visual hallucinations. Family brought patient to ER last Monday and she was diagnosed with UTI and sent home on Vantin. However, her symptoms did not improve. Patient was staying up up all night talking to imaginary people on the phone and in her room, as if guiding them in a museum, and seeing people coming into her room. In AM she was much less confused but tired, and "not understanding what was going on". Family brought pt to ER for further evaluation.   As per Amaya, Pt has been slightly more forgetful over the last 4-5 months, at times forgetting the day of the week.  Pt was seen in her room, calm, cooperative, pleasant, A,Ox3, not distractible, not responding to internal stimuli, but hard to interview due to hearing impairment. Pt reports feeling "OK", although "tired". She does not remember why she was admitted, stating "I probably passed out at home". When asked about seeing strange people at home Pt said she does not remember anything of that.     PPH: Was receiving Paxil 20mg daily for years from PMD,      CC: "I am OK. I probably passed out at home"    HPI: 89 year old W with PMH of DM, Diverticulosis, HTN, DM, HFpEF, ch resp failure on 24/7 2L NC ( was brought in by family for confusion and admitted to 4S on 12/04. As per her daughter Amaya who lives with her (phone [phone]), since Friday 11/27/20 patient has been confused with auditory and visual hallucinations. Family brought patient to ER last Monday and she was diagnosed with UTI and sent home on Vantin. However, her symptoms did not improve. Patient was staying up up all night talking to imaginary people on the phone and in her room, as if guiding them in a museum, and seeing people coming into her room. In AM she was much less confused but tired, and "not understanding what was going on". Family brought pt to ER for further evaluation.   As per Amaya, Pt has been slightly more forgetful over the last 4-5 months, at times forgetting the day of the week.  Pt was seen in her room, calm, cooperative, pleasant, A,Ox3, not distractible, not responding to internal stimuli, but hard to interview due to hearing impairment. Pt reports feeling "OK", although "tired". She does not remember why she was admitted, stating "I probably passed out at home". When asked about seeing strange people at home Pt said she does not remember anything of that.     PPH: Was receiving Paxil 20mg daily for years from PMD, as well as sporadic small doses of Xanax, never treated by a psychiatrist, no h/o IPP, no SAs. As per daughter, Paxil does not seem to be helpful.    PMH: as above    Drug Hx: a glass of wine a day, no tobacco, no illicit drug use    FH: No known FH of mental illness    SH: , has 3 children, lives with daughter Amaya.     MSE: A,Ox3, calm, cooperative, pleasant, not distractible, not responding to internal stimuli, but hard to interview due to hearing impairment. Speech with NL r/r/v, mood fair, affect full, t/p linear, t/c no si/hi/ah/vh, no delusions, +amnesia for the time of the delirium, I/J fair.     CC: "I am OK. I probably passed out at home"    HPI: 89 year old W with PMH of DM, Diverticulosis, HTN, DM, HFpEF, ch resp failure on 24/7 2L NC ( was brought in by family for confusion and admitted to 4S on 12/04. As per her daughter Amaya who lives with her (phone [phone]), since Friday 11/27/20 patient has been confused with auditory and visual hallucinations. Family brought patient to ER last Monday and she was diagnosed with UTI and sent home on Vantin. However, her symptoms did not improve. Patient was staying up up all night talking to imaginary people on the phone and in her room, as if guiding them in a museum, and seeing people coming into her room. In AM she was much less confused but tired, and "not understanding what was going on". Family brought pt to ER for further evaluation.   As per Amaya, Pt has been slightly more forgetful over the last 4-5 months, at times forgetting the day of the week.  Pt was seen in her room, calm, cooperative, pleasant, A,Ox3, not distractible, not responding to internal stimuli, but hard to interview due to hearing impairment. Pt reports feeling "OK", although "tired". She does not remember why she was admitted, stating "I probably passed out at home". When asked about seeing strange people at home Pt said she does not remember anything of that.     PPH: Was receiving Paxil 20mg daily for years from PMD, as well as sporadic small doses of Xanax, never treated by a psychiatrist, no h/o IPP, no SAs. As per daughter, Paxil does not seem to be helpful.    PMH: as above    Drug Hx: a glass of wine a day, no tobacco, no illicit drug use    FH: No known FH of mental illness    SH: , has 3 children, lives with daughter Amaya.     MSE: A,Ox3, calm, cooperative, pleasant, not distractible, not responding to internal stimuli, but hard to interview due to hearing impairment. Speech with NL r/r/v, mood fair, affect full, t/p linear, t/c no si/hi/ah/vh, no delusions, +amnesia for the time of the delirium, I/J fair.      LABS                    12.5   7.67  )-----------( 213      ( 05 Dec 2020 06:54 )             38.8     12-05    137  |  97<L>  |  10  ----------------------------<  197<H>  3.9   |  36<H>  |  0.7    Ca    8.7      05 Dec 2020 06:54  Mg     1.9     12-05    TPro  6.7  /  Alb  3.7  /  TBili  0.4  /  DBili  x   /  AST  49<H>  /  ALT  31  /  AlkPhos  248<H>  12-04      MEDICATIONS  (STANDING):  amLODIPine   Tablet 5 milliGRAM(s) Oral daily  ascorbic acid 500 milliGRAM(s) Oral daily  cefpodoxime 100 milliGRAM(s) Oral every 12 hours  dextrose 40% Gel 15 Gram(s) Oral once  dextrose 5%. 1000 milliLiter(s) (50 mL/Hr) IV Continuous <Continuous>  dextrose 5%. 1000 milliLiter(s) (100 mL/Hr) IV Continuous <Continuous>  dextrose 50% Injectable 25 Gram(s) IV Push once  dextrose 50% Injectable 12.5 Gram(s) IV Push once  dextrose 50% Injectable 25 Gram(s) IV Push once  dorzolamide 2% Ophthalmic Solution 1 Drop(s) Both EYES three times a day  furosemide    Tablet 20 milliGRAM(s) Oral daily  glucagon  Injectable 1 milliGRAM(s) IntraMuscular once  heparin   Injectable 5000 Unit(s) SubCutaneous every 12 hours  insulin lispro (ADMELOG) corrective regimen sliding scale   SubCutaneous three times a day before meals  insulin lispro Injectable (ADMELOG) 3 Unit(s) SubCutaneous before breakfast  insulin lispro Injectable (ADMELOG) 3 Unit(s) SubCutaneous before lunch  insulin lispro Injectable (ADMELOG) 3 Unit(s) SubCutaneous before dinner  latanoprost 0.005% Ophthalmic Solution 1 Drop(s) Both EYES at bedtime  lisinopril 10 milliGRAM(s) Oral daily  PARoxetine 20 milliGRAM(s) Oral daily  pilocarpine 1% Solution 1 Drop(s) Both EYES daily  potassium chloride    Tablet ER 20 milliEquivalent(s) Oral daily  propranolol 20 milliGRAM(s) Oral two times a day  timolol 0.5% Solution 1 Drop(s) Both EYES two times a day  vitamin B complex with vitamin C 1 Tablet(s) Oral daily  vitamin E 200 International Unit(s) Oral daily    MEDICATIONS  (PRN):

## 2020-12-05 NOTE — CONSULT NOTE ADULT - PROBLEM SELECTOR RECOMMENDATION 9
Likely delirium due to UTI or other medical causes.  Investigate and treat medical causes of delirium. Avoid benzos as they can worsen confusion.  Seroquel 12.5-25mg PO q6h PRN for hallucinations and agitation. Monitor Hgb A1c and lipids.  Taper off Paxil (due to anticholinergic action) by 10mg q3-4 days.

## 2020-12-05 NOTE — PROGRESS NOTE ADULT - SUBJECTIVE AND OBJECTIVE BOX
MAGDALENA FISCHER  89y, Female  Allergy: No Known Allergies    Hospital Day: 1d    Patient seen and examined earlier today. AAOx3, patient mentating well and answering my questions. Does not appear to be confused. WALLY.     PMH/PSH:  PAST MEDICAL & SURGICAL HISTORY:  Pneumothorax    Diverticulosis    Macular degeneration, age related    Glaucoma    HTN (hypertension)    DM (diabetes mellitus)    H/O hernia repair        LAST 24-Hr EVENTS:    VITALS:  T(F): 96.6 (20 @ 05:17), Max: 98.7 (20 @ 18:53)  HR: 67 (20 @ 05:17)  BP: 115/58 (20 @ 05:17) (115/58 - 171/72)  RR: 18 (20 @ 05:17)  SpO2: 100% (20 @ 08:01)        TESTS & MEASUREMENTS:  Weight (Kg): 45.6 (20 @ 22:56)  BMI (kg/m2): 22.6 ()                          12.5   7.67  )-----------( 213      ( 05 Dec 2020 06:54 )             38.8           137  |  97<L>  |  10  ----------------------------<  197<H>  3.9   |  36<H>  |  0.7    Ca    8.7      05 Dec 2020 06:54  Mg     1.9         TPro  6.7  /  Alb  3.7  /  TBili  0.4  /  DBili  x   /  AST  49<H>  /  ALT  31  /  AlkPhos  248<H>      LIVER FUNCTIONS - ( 04 Dec 2020 19:56 )  Alb: 3.7 g/dL / Pro: 6.7 g/dL / ALK PHOS: 248 U/L / ALT: 31 U/L / AST: 49 U/L / GGT: x                 Culture - Urine (collected 20 @ 17:00)  Source: .Urine Clean Catch (Midstream)  Final Report (20 @ 19:17):    >100,000 CFU/ml Streptococcus agalactiae (Group B) isolated    Group B streptococci are susceptible to ampicillin,    penicillin and cefazolin, but may be resistant to    erythromycin and clindamycin.    Recommendations for intrapartum prophylaxis for Group B    streptococci are penicillin or ampicillin.      Urinalysis Basic - ( 04 Dec 2020 19:56 )    Color: Yellow / Appearance: Clear / S.020 / pH: x  Gluc: x / Ketone: Negative  / Bili: Negative / Urobili: 0.2 mg/dL   Blood: x / Protein: Negative mg/dL / Nitrite: Negative   Leuk Esterase: Negative / RBC: x / WBC x   Sq Epi: x / Non Sq Epi: x / Bacteria: x                  RADIOLOGY, ECG, & ADDITIONAL TESTS:  12 Lead ECG:   Ventricular Rate 70 BPM    Atrial Rate 70 BPM    P-R Interval 170 ms    QRS Duration 118 ms    Q-T Interval 412 ms    QTC Calculation(Bazett) 444 ms    P Axis 41 degrees    R Axis -33 degrees    T Axis 65 degrees    Diagnosis Line Sinus rhythm withPremature atrial complexes  Left axis deviation  Poor R wave progression  Confirmed by YANG ARTHUR MD ([id number]) on 2020 9:53:58 AM (20 @ 19:38)      RECENT DIAGNOSTIC ORDERS:  Thyroid Stimulating Hormone, Serum: 15:00 (20 @ 10:37)  Folate, Serum: 15:00 (20 @ 10:37)  Vitamin B12, Serum: 15:00 (20 @ 10:37)  US Abdomen Limited: Routine   Indication: RUQ US  Transport: Wheelchair (20 @ 10:35)  EEG:   Transport: Portable  Hemorrhage:No  Brain Death: No  MI:No  Sickle Cell:No   Stimulants:No  Anti-Convulsants:No   Anti-Depressants:No  Contr Substances:No   Ordering Provider's Pager/Contact #:[phone] (20 @ 08:02)  A1C with Estimated Average Glucose: AM Sched. Collection: 06-Dec-2020 04:30 (20 @ 06:29)  Diet, DASH/TLC:   Sodium & Cholesterol Restricted (20 @ 04:47)  COVID-19  Antibody - for prior infection screening: Routine (20 @ 22:19)  Culture - Urine: Routine  Specimen Source: Clean Catch (Midstream) (20 @ 19:13)  Culture - Blood: Routine  Specimen Source: Blood  Addl Info: Peripheral site 2 (20 @ 19:13)  Culture - Blood: Routine  Specimen Source: Blood  Addl Info: Peripheral site 1 (20 @ 19:13)      MEDICATIONS:  MEDICATIONS  (STANDING):  amLODIPine   Tablet 5 milliGRAM(s) Oral daily  ascorbic acid 500 milliGRAM(s) Oral daily  cefpodoxime 100 milliGRAM(s) Oral every 12 hours  dextrose 40% Gel 15 Gram(s) Oral once  dextrose 5%. 1000 milliLiter(s) (50 mL/Hr) IV Continuous <Continuous>  dextrose 5%. 1000 milliLiter(s) (100 mL/Hr) IV Continuous <Continuous>  dextrose 50% Injectable 25 Gram(s) IV Push once  dextrose 50% Injectable 12.5 Gram(s) IV Push once  dextrose 50% Injectable 25 Gram(s) IV Push once  dorzolamide 2% Ophthalmic Solution 1 Drop(s) Both EYES three times a day  furosemide    Tablet 20 milliGRAM(s) Oral daily  glucagon  Injectable 1 milliGRAM(s) IntraMuscular once  heparin   Injectable 5000 Unit(s) SubCutaneous every 12 hours  insulin lispro (ADMELOG) corrective regimen sliding scale   SubCutaneous three times a day before meals  insulin lispro Injectable (ADMELOG) 3 Unit(s) SubCutaneous before breakfast  insulin lispro Injectable (ADMELOG) 3 Unit(s) SubCutaneous before lunch  insulin lispro Injectable (ADMELOG) 3 Unit(s) SubCutaneous before dinner  latanoprost 0.005% Ophthalmic Solution 1 Drop(s) Both EYES at bedtime  lisinopril 10 milliGRAM(s) Oral daily  PARoxetine 20 milliGRAM(s) Oral daily  pilocarpine 1% Solution 1 Drop(s) Both EYES daily  potassium chloride    Tablet ER 20 milliEquivalent(s) Oral daily  propranolol 20 milliGRAM(s) Oral two times a day  timolol 0.5% Solution 1 Drop(s) Both EYES two times a day  vitamin B complex with vitamin C 1 Tablet(s) Oral daily  vitamin E 200 International Unit(s) Oral daily    MEDICATIONS  (PRN):      HOME MEDICATIONS:  amlodipine-benazepril 5 mg-10 mg oral capsule ()  Cosopt ()  glimepiride 1 mg oral tablet ()  PARoxetine 20 mg oral tablet ()  pilocarpine ()  potassium chloride 20 mEq oral tablet, extended release ()  propranolol 40 mg oral tablet ()  Super B Complex oral tablet ()  Systane ophthalmic solution ()  Travatan ()  Vantin 200 mg oral tablet ()  Vitamin B12 1000 mcg oral tablet ()  Vitamin C 500 mg oral tablet ()  vitamin E 180 mg oral capsule ()      PHYSICAL EXAM:  GENERAL: A&O x3,  pleasant elderly female in NAD  HNENT: EOMI, PERRLA    NECK: No LAD/swelling  CHEST/LUNG:  CTAB no wheezes/rales/ronchi  HEART: RRR, No murmurs  ABDOMEN: Soft, NT, ND,  Bowel sounds present  EXTREMITIES:  Warm well perfused, no edema

## 2020-12-05 NOTE — PHYSICAL THERAPY INITIAL EVALUATION ADULT - ADDITIONAL COMMENTS
Pt lives with daughter in pvt house, steps to enter and steps to bedroom.   Pt has RW, but does not like to use it.

## 2020-12-05 NOTE — PHYSICAL THERAPY INITIAL EVALUATION ADULT - GENERAL OBSERVATIONS, REHAB EVAL
10:45-11:15 Chart reviewed. Patient available to be seen for physical therapy, denies pain, confirmed with RN. Pt rec'd in bed +2L nasal O2 in NAD

## 2020-12-05 NOTE — PHYSICAL THERAPY INITIAL EVALUATION ADULT - CRITERIA FOR SKILLED THERAPEUTIC INTERVENTIONS
therapy frequency/impairments found/functional limitations in following categories/rehab potential/predicted duration of therapy intervention

## 2020-12-05 NOTE — CONSULT NOTE ADULT - SUBJECTIVE AND OBJECTIVE BOX
Neurology Consultation note    Name  MAGDALENA FISCHER    HPI:  Patient is a poor historian. History and med recon taken daughter over phone.    89 year old female with PMH dementia, DM, Diverticulosis, HTN, DM, HFpEF, ch resp failure on 24/7 2L NC ( was brought in by family for confusion. As per daughter, since last Friday patient has been confused with auditory and visual hallucinations. Family brought patient to ER last Monday and was diagnosed with UTI and sent home on Vantin. However, her symptoms did not improve. Yesterday, patient stayed up all night talking alone and seeing people come into her room. Today patient was lethargic, minimally responsive so family brought pt to ER for further evaluation.     At baseline, patient is AAOx2-3, lives home with daughter, unsteady gait (refuses to use walker at home), needs assistance with ALS, social negx3.  (04 Dec 2020 22:17)      NEURO:  89 yo female with hx a s above adm for ams.  patient with visual and auditory hallucinations  this am appears to be at baseline  no complaints      Vital Signs Last 24 Hrs  T(C): 35.9 (05 Dec 2020 05:17), Max: 37.1 (04 Dec 2020 18:53)  T(F): 96.6 (05 Dec 2020 05:17), Max: 98.7 (04 Dec 2020 18:53)  HR: 67 (05 Dec 2020 05:17) (64 - 69)  BP: 115/58 (05 Dec 2020 05:17) (115/58 - 171/72)  BP(mean): --  RR: 18 (05 Dec 2020 05:17) (16 - 18)  SpO2: 100% (05 Dec 2020 08:01) (100% - 100%)    Neurological Exam:   Mental status: Awake, alert and oriented x3  Naming, repetition and comprehension intact.  Attention/concentration intact.  No dysarthria, no aphasia.  lacks insight into visit  Cranial nerves: Pupils equally round and reactive to light, visual fields full, no nystagmus, extraocular muscles intact, V1 through V3 intact bilaterally and symmetric, face symmetric, hearing intact to finger rub, palate elevation symmetric, tongue was midline.  Motor:  MRC grading 5/5 b/l UE/LE.   strength 5/5.  Normal tone and bulk.  No abnormal movements.    Sensation: Intact to light touch, proprioception, and pinprick.   Coordination: No dysmetria on finger-to-nose and heel-to-shin.  No dysdiadokinesia.  Reflexes: 2+ in bilateral UE/LE, downgoing toes bilaterally. (-) Cerrato.      Medications  amLODIPine   Tablet 5 milliGRAM(s) Oral daily  ascorbic acid 500 milliGRAM(s) Oral daily  cefpodoxime 100 milliGRAM(s) Oral every 12 hours  dextrose 40% Gel 15 Gram(s) Oral once  dextrose 5%. 1000 milliLiter(s) IV Continuous <Continuous>  dextrose 5%. 1000 milliLiter(s) IV Continuous <Continuous>  dextrose 50% Injectable 25 Gram(s) IV Push once  dextrose 50% Injectable 12.5 Gram(s) IV Push once  dextrose 50% Injectable 25 Gram(s) IV Push once  dorzolamide 2% Ophthalmic Solution 1 Drop(s) Both EYES three times a day  furosemide    Tablet 20 milliGRAM(s) Oral daily  glucagon  Injectable 1 milliGRAM(s) IntraMuscular once  heparin   Injectable 5000 Unit(s) SubCutaneous every 12 hours  insulin lispro (ADMELOG) corrective regimen sliding scale   SubCutaneous three times a day before meals  insulin lispro Injectable (ADMELOG) 3 Unit(s) SubCutaneous before breakfast  insulin lispro Injectable (ADMELOG) 3 Unit(s) SubCutaneous before lunch  insulin lispro Injectable (ADMELOG) 3 Unit(s) SubCutaneous before dinner  latanoprost 0.005% Ophthalmic Solution 1 Drop(s) Both EYES at bedtime  lisinopril 10 milliGRAM(s) Oral daily  PARoxetine 20 milliGRAM(s) Oral daily  pilocarpine 1% Solution 1 Drop(s) Both EYES daily  potassium chloride    Tablet ER 20 milliEquivalent(s) Oral daily  propranolol 20 milliGRAM(s) Oral two times a day  timolol 0.5% Solution 1 Drop(s) Both EYES two times a day  vitamin B complex with vitamin C 1 Tablet(s) Oral daily  vitamin E 200 International Unit(s) Oral daily      Lab  12-05    137  |  97<L>  |  10  ----------------------------<  197<H>  3.9   |  36<H>  |  0.7    Ca    8.7      05 Dec 2020 06:54  Mg     1.9     12-05    TPro  6.7  /  Alb  3.7  /  TBili  0.4  /  DBili  x   /  AST  49<H>  /  ALT  31  /  AlkPhos  248<H>  12-04                          12.5   7.67  )-----------( 213      ( 05 Dec 2020 06:54 )             38.8     LIVER FUNCTIONS - ( 04 Dec 2020 19:56 )  Alb: 3.7 g/dL / Pro: 6.7 g/dL / ALK PHOS: 248 U/L / ALT: 31 U/L / AST: 49 U/L / GGT: x             1.9  2.0        Radiology    cth no acute pathology  b mastoid clouding    Assessment:  89 yo female with hx as above adm w/ ams  patient is a/0 x 3 this am  no complaints and non focal  suspect TME: uti? ? mastoiditis based on cth findings?  Plan:  check b12, folate and tsh  reeg and if non epileptiform then no further w/u is needed  please call with any questions

## 2020-12-05 NOTE — CONSULT NOTE ADULT - ASSESSMENT
IMPRESSION: Rehab of 89 y/o  f  rehab  for debility uti    PRECAUTIONS: [  ] Cardiac  [  ] Respiratory  [  ] Seizures [  ] Contact Isolation  [  ] Droplet Isolation  [ FALL ] Other    Weight Bearing Status:     RECOMMENDATION:    Out of Bed to Chair     DVT/Decubiti Prophylaxis    REHAB PLAN:     [ xx ] Bedside P/T 3-5 times a week   [   ]   Bedside O/T  2-3 times a week             [   ] No Rehab Therapy Indicated                   [   ]  Speech Therapy   Conditioning/ROM                                    ADL  Bed Mobility                                               Conditioning/ROM  Transfers                                                     Bed Mobility  Sitting /Standing Balance                         Transfers                                        Gait Training                                               Sitting/Standing Balance  Stair Training [   ]Applicable                    Home equipment Eval                                                                        Splinting  [   ] Only      GOALS:   ADL   [   ]   Independent                    Transfers  [   ] Independent                          Ambulation  [   ] Independent     [    ] With device                            [   ]  CG                                                         [   ]  CG                                                                  [   ] CG                            [    ] Min A                                                   [   ] Min A                                                              [   ] Min  A          DISCHARGE PLAN:   [   ]  Good candidate for Intensive Rehabilitation/Hospital based-4A [facility]                                             Will tolerate 3hrs Intensive Rehab Daily                                       [    ]  Short Term Rehab in Skilled Nursing Facility                                       [  xx  ]  Home with Outpatient or VN services cont  cuurent care  dw  with  ptn  DTRS                                         [    ]  Possible Candidate for Intensive Hospital based Rehab      
89 year old W with PMH of DM, Diverticulosis, HTN, DM, HFpEF, ch resp failure on 24/7 2L NC ( was brought in by family for confusion with AH/VH and admitted to  on 12/04. Currently she is A,Ox3.    Likely delirium due to UTI or other medical causes.    Case was discussed with Dr Ren.

## 2020-12-05 NOTE — CONSULT NOTE ADULT - SUBJECTIVE AND OBJECTIVE BOX
HPI:  89 year old female [facility] with PMH dementia, DM, Diverticulosis, HTN, DM, HFpEF, ch resp failure on  2L NC ( was brought in by family for confusion. As per daughter, since last Friday patient has been confused with auditory and visual hallucinations. Family brought patient to ER last Monday and was diagnosed with UTI and sent home on Vantin. However, her symptoms did not improve. Yesterday, patient stayed up all night talking alone and seeing people come into her room. Today patient was lethargic, minimally responsive so family brought pt to ER for further evaluation.   At baseline, patient is AAOx2-3, lives home with daughter, unsteady gait (refuses to use walker at home), needs assistance with ALS, social negx3.  ptn  seen and exam  at  bed side  oob  to  chair  fu  command [place] dtr  at bed  side     PTN  REFERRED TO ACUTE  REHAB  FOR  EVAL AND  TX   PAST MEDICAL & SURGICAL HISTORY:  Pneumothorax  Diverticulosis  Macular degeneration, age related  Glaucoma  HTN (hypertension)  DM (diabetes mellitus)  H/O hernia repair        Hospital Course:    TODAY'S SUBJECTIVE & REVIEW OF SYMPTOMS:     Constitutional WNL   Cardio WNL   Resp WNL   GI WNL  Heme WNL  Endo WNL  Skin WNL  MSK WNL  Neuro WNL  Cognitive WNL  Psych WNL      MEDICATIONS  (STANDING):  amLODIPine   Tablet 5 milliGRAM(s) Oral daily  ascorbic acid 500 milliGRAM(s) Oral daily  cefpodoxime 100 milliGRAM(s) Oral every 12 hours  dextrose 40% Gel 15 Gram(s) Oral once  dextrose 5%. 1000 milliLiter(s) (50 mL/Hr) IV Continuous <Continuous>  dextrose 5%. 1000 milliLiter(s) (100 mL/Hr) IV Continuous <Continuous>  dextrose 50% Injectable 25 Gram(s) IV Push once  dextrose 50% Injectable 12.5 Gram(s) IV Push once  dextrose 50% Injectable 25 Gram(s) IV Push once  dorzolamide 2% Ophthalmic Solution 1 Drop(s) Both EYES three times a day  furosemide    Tablet 20 milliGRAM(s) Oral daily  glucagon  Injectable 1 milliGRAM(s) IntraMuscular once  heparin   Injectable 5000 Unit(s) SubCutaneous every 12 hours  insulin lispro (ADMELOG) corrective regimen sliding scale   SubCutaneous three times a day before meals  insulin lispro Injectable (ADMELOG) 3 Unit(s) SubCutaneous before breakfast  insulin lispro Injectable (ADMELOG) 3 Unit(s) SubCutaneous before lunch  insulin lispro Injectable (ADMELOG) 3 Unit(s) SubCutaneous before dinner  latanoprost 0.005% Ophthalmic Solution 1 Drop(s) Both EYES at bedtime  lisinopril 10 milliGRAM(s) Oral daily  PARoxetine 20 milliGRAM(s) Oral daily  pilocarpine 1% Solution 1 Drop(s) Both EYES daily  potassium chloride    Tablet ER 20 milliEquivalent(s) Oral daily  propranolol 20 milliGRAM(s) Oral two times a day  timolol 0.5% Solution 1 Drop(s) Both EYES two times a day  vitamin B complex with vitamin C 1 Tablet(s) Oral daily  vitamin E 200 International Unit(s) Oral daily    MEDICATIONS  (PRN):      FAMILY HISTORY:  FH: hypertension        Allergies    No Known Allergies    Intolerances        SOCIAL HISTORY:    [  ] Etoh  [  ] Smoking  [  ] Substance abuse     Home Environment:  [  ] Home Alone  [ x ] Lives with Family DTR  [  ] Home Health Aid    Dwelling:  [  ] Apartment  [x  ] Private House  [  ] Adult Home  [  ] Skilled Nursing Facility      [  ] Short Term  [  ] Long Term  [  x] Stairs       Elevator [  ]    FUNCTIONAL STATUS PTA: (Check all that apply)  Ambulation: [   ]Independent    [  ] Dependent     [  ] Non-Ambulatory  Assistive Device: [  ] SA Cane  [  ]  Q Cane  [  ] Walker  [  ]  Wheelchair  ADL : [  ] Independent  [  ]  Dependent       Vital Signs Last 24 Hrs  T(C): 35.9 (05 Dec 2020 05:17), Max: 37.1 (04 Dec 2020 18:53)  T(F): 96.6 (05 Dec 2020 05:17), Max: 98.7 (04 Dec 2020 18:53)  HR: 67 (05 Dec 2020 05:17) (64 - 69)  BP: 115/58 (05 Dec 2020 05:17) (115/58 - 171/72)  BP(mean): --  RR: 18 (05 Dec 2020 05:17) (16 - 18)  SpO2: 100% (05 Dec 2020 08:01) (100% - 100%)      PHYSICAL EXAM: Alert & Oriented X 2  GENERAL: NAD, well-groomed, well-developed  HEAD:  Atraumatic, Normocephalic  EYES: EOMI, PERRLA, conjunctiva and sclera clear  NECK: Supple, No JVD, Normal thyroid  CHEST/LUNG: Clear to percussion bilaterally; No rales, rhonchi, wheezing, or rubs  HEART: Regular rate and rhythm; No murmurs, rubs, or gallops  ABDOMEN: Soft, Nontender, Nondistended; Bowel sounds present  EXTREMITIES:  2+ Peripheral Pulses, No clubbing, cyanosis, or edema    NERVOUS SYSTEM:  Cranial Nerves 2-12 intact [x  ] Abnormal  [  ]  ROM: WFL all extremities [x  ]  Abnormal [  ]  Motor Strength: WFL all extremities  [x  ]  Abnormal [  ]  Sensation: intact to light touch [x  ] Abnormal [  ]  Reflexes: Symmetric [ x ]  Abnormal [  ]    FUNCTIONAL STATUS:  Bed Mobility: Independent [  ]  Supervision [ x  Needs Assistance [  ]  N/A [  ]  Transfers: Independent [  ]  Supervision [ x ]  Needs Assistance [  ]  N/A [  ]   Ambulation: Independent [  ]  Supervision [x  ]  Needs Assistance [  ]  N/A [  ]  ADL: Independent [x  ] Requires Assistance [  ] N/A [  ]  SEE PT/OT IE NOTES    LABS:                        12.5   7.67  )-----------( 213      ( 05 Dec 2020 06:54 )             38.8     12-05    137  |  97<L>  |  10  ----------------------------<  197<H>  3.9   |  36<H>  |  0.7    Ca    8.7      05 Dec 2020 06:54  Mg     1.9     12-05    TPro  6.7  /  Alb  3.7  /  TBili  0.4  /  DBili  x   /  AST  49<H>  /  ALT  31  /  AlkPhos  248<H>  12-04      Urinalysis Basic - ( 04 Dec 2020 19:56 )    Color: Yellow / Appearance: Clear / S.020 / pH: x  Gluc: x / Ketone: Negative  / Bili: Negative / Urobili: 0.2 mg/dL   Blood: x / Protein: Negative mg/dL / Nitrite: Negative   Leuk Esterase: Negative / RBC: x / WBC x   Sq Epi: x / Non Sq Epi: x / Bacteria: x        RADIOLOGY & ADDITIONAL STUDIES:    Assesment:

## 2020-12-06 ENCOUNTER — TRANSCRIPTION ENCOUNTER (OUTPATIENT)
Age: 85
End: 2020-12-06

## 2020-12-06 VITALS
TEMPERATURE: 97 F | SYSTOLIC BLOOD PRESSURE: 137 MMHG | RESPIRATION RATE: 16 BRPM | DIASTOLIC BLOOD PRESSURE: 64 MMHG | HEART RATE: 63 BPM

## 2020-12-06 LAB
ANION GAP SERPL CALC-SCNC: 9 MMOL/L — SIGNIFICANT CHANGE UP (ref 7–14)
BUN SERPL-MCNC: 19 MG/DL — SIGNIFICANT CHANGE UP (ref 10–20)
CALCIUM SERPL-MCNC: 9.7 MG/DL — SIGNIFICANT CHANGE UP (ref 8.5–10.1)
CHLORIDE SERPL-SCNC: 98 MMOL/L — SIGNIFICANT CHANGE UP (ref 98–110)
CO2 SERPL-SCNC: 35 MMOL/L — HIGH (ref 17–32)
CREAT SERPL-MCNC: 1.1 MG/DL — SIGNIFICANT CHANGE UP (ref 0.7–1.5)
GLUCOSE BLDC GLUCOMTR-MCNC: 138 MG/DL — HIGH (ref 70–99)
GLUCOSE BLDC GLUCOMTR-MCNC: 199 MG/DL — HIGH (ref 70–99)
GLUCOSE BLDC GLUCOMTR-MCNC: 458 MG/DL — CRITICAL HIGH (ref 70–99)
GLUCOSE BLDC GLUCOMTR-MCNC: 471 MG/DL — CRITICAL HIGH (ref 70–99)
GLUCOSE SERPL-MCNC: 127 MG/DL — HIGH (ref 70–99)
POTASSIUM SERPL-MCNC: 4.2 MMOL/L — SIGNIFICANT CHANGE UP (ref 3.5–5)
POTASSIUM SERPL-SCNC: 4.2 MMOL/L — SIGNIFICANT CHANGE UP (ref 3.5–5)
SARS-COV-2 IGG SERPL QL IA: NEGATIVE — SIGNIFICANT CHANGE UP
SARS-COV-2 IGM SERPL IA-ACNC: <0.1 INDEX — SIGNIFICANT CHANGE UP
SODIUM SERPL-SCNC: 142 MMOL/L — SIGNIFICANT CHANGE UP (ref 135–146)

## 2020-12-06 PROCEDURE — 95816 EEG AWAKE AND DROWSY: CPT | Mod: 26

## 2020-12-06 RX ORDER — CEFPODOXIME PROXETIL 100 MG
1 TABLET ORAL
Qty: 0 | Refills: 0 | DISCHARGE

## 2020-12-06 RX ORDER — QUETIAPINE FUMARATE 200 MG/1
0.5 TABLET, FILM COATED ORAL
Qty: 15 | Refills: 0
Start: 2020-12-06 | End: 2021-01-04

## 2020-12-06 RX ORDER — INSULIN LISPRO 100/ML
4 VIAL (ML) SUBCUTANEOUS ONCE
Refills: 0 | Status: COMPLETED | OUTPATIENT
Start: 2020-12-06 | End: 2020-12-06

## 2020-12-06 RX ORDER — INSULIN LISPRO 100/ML
4 VIAL (ML) SUBCUTANEOUS ONCE
Refills: 0 | Status: DISCONTINUED | OUTPATIENT
Start: 2020-12-06 | End: 2020-12-06

## 2020-12-06 RX ORDER — SODIUM CHLORIDE 9 MG/ML
500 INJECTION INTRAMUSCULAR; INTRAVENOUS; SUBCUTANEOUS ONCE
Refills: 0 | Status: COMPLETED | OUTPATIENT
Start: 2020-12-06 | End: 2020-12-06

## 2020-12-06 RX ORDER — CEFPODOXIME PROXETIL 100 MG
1 TABLET ORAL
Qty: 0 | Refills: 0 | DISCHARGE
End: 2020-12-10

## 2020-12-06 RX ADMIN — SODIUM CHLORIDE 250 MILLILITER(S): 9 INJECTION INTRAMUSCULAR; INTRAVENOUS; SUBCUTANEOUS at 13:17

## 2020-12-06 RX ADMIN — Medication 3 UNIT(S): at 11:37

## 2020-12-06 RX ADMIN — Medication 4 UNIT(S): at 13:23

## 2020-12-06 RX ADMIN — Medication 20 MILLIEQUIVALENT(S): at 11:06

## 2020-12-06 RX ADMIN — Medication 1 TABLET(S): at 11:06

## 2020-12-06 RX ADMIN — HEPARIN SODIUM 5000 UNIT(S): 5000 INJECTION INTRAVENOUS; SUBCUTANEOUS at 05:30

## 2020-12-06 RX ADMIN — Medication 20 MILLIGRAM(S): at 11:05

## 2020-12-06 RX ADMIN — DORZOLAMIDE HYDROCHLORIDE 1 DROP(S): 20 SOLUTION/ DROPS OPHTHALMIC at 13:26

## 2020-12-06 RX ADMIN — Medication 1 DROP(S): at 05:31

## 2020-12-06 RX ADMIN — DORZOLAMIDE HYDROCHLORIDE 1 DROP(S): 20 SOLUTION/ DROPS OPHTHALMIC at 05:31

## 2020-12-06 RX ADMIN — Medication 20 MILLIGRAM(S): at 05:30

## 2020-12-06 RX ADMIN — Medication 500 MILLIGRAM(S): at 11:05

## 2020-12-06 RX ADMIN — INSULIN GLARGINE 5 UNIT(S): 100 INJECTION, SOLUTION SUBCUTANEOUS at 07:58

## 2020-12-06 RX ADMIN — Medication 1 DROP(S): at 11:06

## 2020-12-06 RX ADMIN — Medication 6: at 11:37

## 2020-12-06 RX ADMIN — Medication 200 INTERNATIONAL UNIT(S): at 11:06

## 2020-12-06 RX ADMIN — Medication 100 MILLIGRAM(S): at 05:30

## 2020-12-06 NOTE — DISCHARGE NOTE NURSING/CASE MANAGEMENT/SOCIAL WORK - PATIENT PORTAL LINK FT
You can access the FollowMyHealth Patient Portal offered by [facility] by registering at the following website: [email or web address]. By joining [employer]’s FollowMyHealth portal, you will also be able to view your health information using other applications (apps) compatible with our system.

## 2020-12-06 NOTE — DISCHARGE NOTE PROVIDER - NSDCMRMEDTOKEN_GEN_ALL_CORE_FT
amlodipine-benazepril 5 mg-10 mg oral capsule: 1 cap(s) orally twice a day  Cosopt: one drop each eye twice a day  glimepiride 1 mg oral tablet: 1 tab(s) orally  if fingerstick is greater than 250  Lasix 40 mg oral tablet: 1 tab(s) orally once a day   PARoxetine 20 mg oral tablet: 0.5 tab(s) orally once a day for 4 days and then discontinue   pilocarpine: one drop each eye twice a day  potassium chloride 20 mEq oral tablet, extended release:   propranolol 40 mg oral tablet: 1/2 tablet twice a day  SEROquel 25 mg oral tablet: 0.5 tab(s) orally once a day (at bedtime)   Super B Complex oral tablet: 1 tab(s) orally once a day  Systane ophthalmic solution: one drop each eye at bedtime  Travatan: one drop each eye at bedtime  Vantin 200 mg oral tablet: 1 tab(s) orally every 12 hours  Vitamin B12 1000 mcg oral tablet: 1 tab(s) orally once a day  Vitamin C 500 mg oral tablet: 1 tab(s) orally once a day  vitamin E 180 mg oral capsule: 1 cap(s) orally once a day

## 2020-12-06 NOTE — CHART NOTE - NSCHARTNOTEFT_GEN_A_CORE
Was notified by RN about pt  after 9 units of Lispro that was given an hour ago.   3 units of Lispro were held this AM.  Case d/w Dr. Ren:  - will give NS 500cc/2h and then 4 units of Lispro  - continue to monitor FS Was notified by RN about pt  after 9 units of Lispro that was given an hour ago.   3 units of Lispro were held this AM.  Case d/w Dr. Ren:  - will give NS 500cc/2h in addition to 4 units of Lispro  - continue to monitor FS

## 2020-12-06 NOTE — DISCHARGE NOTE PROVIDER - HOSPITAL COURSE
HPI:   Patient is a poor historian. History and med recon taken daughter over phone.    89 year old female with PMH dementia, DM, Diverticulosis, HTN, DM, HFpEF, ch resp failure on 24/7 2L NC ( was brought in by family for confusion. As per daughter, since last Friday patient has been confused with auditory and visual hallucinations. Family brought patient to ER last Monday and was diagnosed with UTI and sent home on Vantin. However, her symptoms did not improve. Yesterday, patient stayed up all night talking alone and seeing people come into her room. Today patient was lethargic, minimally responsive so family brought pt to ER for further evaluation.     At baseline, patient is AAOx2-3, lives home with daughter, unsteady gait (refuses to use walker at home), needs assistance with ALS, social negx3.         Hospital Course:   Patient admitted to the hospital for worsening auditory and visual hallucinations at home. During the course of the hospitalization, mentation significantly improved, AAOx3 and answering questions appropriately. Labwork consistent with improving UTI on current PO abx regimen. Neurology evaluated patient, rEEG and labwork were unremarkable. Psychiatry evaluated patient, and recommended titrating off Paxil, and starting seroquel prn for agitation/hallucinations. Physiatry saw patient, and she is able to be discharged home. Patient safe to be discharged home, discussed with daughter.       Physical Exam:   GENERAL: A&O x3,  pleasant elderly female in NAD  HNENT: EOMI, PERRLA    NECK: No LAD/swelling  CHEST/LUNG:  CTAB no wheezes/rales/ronchi  HEART: RRR, No murmurs  ABDOMEN: Soft, NT, ND,  Bowel sounds present  EXTREMITIES:  Warm well perfused, no edema       Plan:   Discharge home to complete 10d course of Cefpodoxime ( End Date 12/10 )     HPI:   Patient is a poor historian. History and med recon taken daughter over phone.    89 year old female with PMH dementia, DM, Diverticulosis, HTN, DM, HFpEF, ch resp failure on 24/7 2L NC ( was brought in by family for confusion. As per daughter, since last Friday patient has been confused with auditory and visual hallucinations. Family brought patient to ER last Monday and was diagnosed with UTI and sent home on Vantin. However, her symptoms did not improve. Yesterday, patient stayed up all night talking alone and seeing people come into her room. Today patient was lethargic, minimally responsive so family brought pt to ER for further evaluation.       At baseline, patient is AAOx2-3, lives home with daughter, unsteady gait (refuses to use walker at home), needs assistance with ALS, social negx3.         Hospital Course:   Patient admitted to the hospital for worsening auditory and visual hallucinations at home. During the course of the hospitalization, mentation significantly improved, AAOx3 and answering questions appropriately. Labwork consistent with improving UTI on current PO abx regimen. Neurology evaluated patient, rEEG and labwork were unremarkable. Psychiatry evaluated patient, and recommended titrating off Paxil, and starting seroquel prn for agitation/hallucinations. Physiatry saw patient, and she is able to be discharged home. Patient safe to be discharged home, discussed with daughter.       Physical Exam:   GENERAL: A&O x3,  pleasant elderly female in NAD  HNENT: EOMI, PERRLA    NECK: No LAD/swelling  CHEST/LUNG:  CTAB no wheezes/rales/ronchi  HEART: RRR, No murmurs  ABDOMEN: Soft, NT, ND,  Bowel sounds present  EXTREMITIES:  Warm well perfused, no edema       Plan:   Discharge home to complete 10d course of Cefpodoxime ( End Date 12/10 )

## 2020-12-06 NOTE — DISCHARGE NOTE PROVIDER - CARE PROVIDER_API CALL
Chloe Ledesmah  INTERNAL MEDICINE  [address], [address]  [place] [address]  Phone: [phone]  Fax: [phone]  Follow Up Time: 1 week

## 2020-12-07 LAB
A1C WITH ESTIMATED AVERAGE GLUCOSE RESULT: 9.3 % — HIGH (ref 4–5.6)
ESTIMATED AVERAGE GLUCOSE: 220 MG/DL — HIGH (ref 68–114)

## 2020-12-09 DIAGNOSIS — R41.0 DISORIENTATION, UNSPECIFIED: ICD-10-CM

## 2020-12-09 DIAGNOSIS — Z79.84 LONG TERM (CURRENT) USE OF ORAL HYPOGLYCEMIC DRUGS: ICD-10-CM

## 2020-12-09 DIAGNOSIS — R26.81 UNSTEADINESS ON FEET: ICD-10-CM

## 2020-12-09 DIAGNOSIS — H35.30 UNSPECIFIED MACULAR DEGENERATION: ICD-10-CM

## 2020-12-09 DIAGNOSIS — F03.90 UNSPECIFIED DEMENTIA WITHOUT BEHAVIORAL DISTURBANCE: ICD-10-CM

## 2020-12-09 DIAGNOSIS — R44.0 AUDITORY HALLUCINATIONS: ICD-10-CM

## 2020-12-09 DIAGNOSIS — R44.1 VISUAL HALLUCINATIONS: ICD-10-CM

## 2020-12-09 DIAGNOSIS — I11.0 HYPERTENSIVE HEART DISEASE WITH HEART FAILURE: ICD-10-CM

## 2020-12-09 DIAGNOSIS — J96.10 CHRONIC RESPIRATORY FAILURE, UNSPECIFIED WHETHER WITH HYPOXIA OR HYPERCAPNIA: ICD-10-CM

## 2020-12-09 DIAGNOSIS — I50.32 CHRONIC DIASTOLIC (CONGESTIVE) HEART FAILURE: ICD-10-CM

## 2020-12-09 DIAGNOSIS — R41.82 ALTERED MENTAL STATUS, UNSPECIFIED: ICD-10-CM

## 2020-12-09 DIAGNOSIS — K57.30 DIVERTICULOSIS OF LARGE INTESTINE WITHOUT PERFORATION OR ABSCESS WITHOUT BLEEDING: ICD-10-CM

## 2020-12-09 DIAGNOSIS — Z66 DO NOT RESUSCITATE: ICD-10-CM

## 2020-12-09 DIAGNOSIS — N39.0 URINARY TRACT INFECTION, SITE NOT SPECIFIED: ICD-10-CM

## 2020-12-09 DIAGNOSIS — H40.9 UNSPECIFIED GLAUCOMA: ICD-10-CM

## 2020-12-09 DIAGNOSIS — E11.9 TYPE 2 DIABETES MELLITUS WITHOUT COMPLICATIONS: ICD-10-CM

## 2020-12-10 LAB
CULTURE RESULTS: SIGNIFICANT CHANGE UP
CULTURE RESULTS: SIGNIFICANT CHANGE UP
SPECIMEN SOURCE: SIGNIFICANT CHANGE UP
SPECIMEN SOURCE: SIGNIFICANT CHANGE UP

## 2021-01-16 ENCOUNTER — EMERGENCY (EMERGENCY)
Facility: HOSPITAL | Age: 86
LOS: 0 days | Discharge: HOME | End: 2021-01-16
Attending: EMERGENCY MEDICINE | Admitting: EMERGENCY MEDICINE
Payer: MEDICARE

## 2021-01-16 VITALS
DIASTOLIC BLOOD PRESSURE: 65 MMHG | OXYGEN SATURATION: 97 % | RESPIRATION RATE: 16 BRPM | HEART RATE: 67 BPM | SYSTOLIC BLOOD PRESSURE: 122 MMHG

## 2021-01-16 VITALS
TEMPERATURE: 96 F | DIASTOLIC BLOOD PRESSURE: 73 MMHG | RESPIRATION RATE: 18 BRPM | SYSTOLIC BLOOD PRESSURE: 162 MMHG | OXYGEN SATURATION: 98 % | HEART RATE: 72 BPM | WEIGHT: 106.92 LBS | HEIGHT: 56 IN

## 2021-01-16 DIAGNOSIS — E11.9 TYPE 2 DIABETES MELLITUS WITHOUT COMPLICATIONS: ICD-10-CM

## 2021-01-16 DIAGNOSIS — S69.90XA UNSPECIFIED INJURY OF UNSPECIFIED WRIST, HAND AND FINGER(S), INITIAL ENCOUNTER: ICD-10-CM

## 2021-01-16 DIAGNOSIS — Z79.84 LONG TERM (CURRENT) USE OF ORAL HYPOGLYCEMIC DRUGS: ICD-10-CM

## 2021-01-16 DIAGNOSIS — S52.502A UNSPECIFIED FRACTURE OF THE LOWER END OF LEFT RADIUS, INITIAL ENCOUNTER FOR CLOSED FRACTURE: ICD-10-CM

## 2021-01-16 DIAGNOSIS — Z98.890 OTHER SPECIFIED POSTPROCEDURAL STATES: ICD-10-CM

## 2021-01-16 DIAGNOSIS — S52.602A UNSPECIFIED FRACTURE OF LOWER END OF LEFT ULNA, INITIAL ENCOUNTER FOR CLOSED FRACTURE: ICD-10-CM

## 2021-01-16 DIAGNOSIS — Z23 ENCOUNTER FOR IMMUNIZATION: ICD-10-CM
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
## 2021-01-16 DIAGNOSIS — I50.9 HEART FAILURE, UNSPECIFIED: ICD-10-CM

## 2021-01-16 DIAGNOSIS — I11.0 HYPERTENSIVE HEART DISEASE WITH HEART FAILURE: ICD-10-CM
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
## 2021-01-16 DIAGNOSIS — Z98.890 OTHER SPECIFIED POSTPROCEDURAL STATES: Chronic | ICD-10-CM

## 2021-01-16 PROCEDURE — 73110 X-RAY EXAM OF WRIST: CPT | Mod: 26,LT

## 2021-01-16 PROCEDURE — 25605 CLTX DST RDL FX/EPHYS SEP W/: CPT | Mod: 54

## 2021-01-16 PROCEDURE — 70450 CT HEAD/BRAIN W/O DYE: CPT | Mod: 26

## 2021-01-16 PROCEDURE — 73090 X-RAY EXAM OF FOREARM: CPT | Mod: 26,LT

## 2021-01-16 PROCEDURE — 99284 EMERGENCY DEPT VISIT MOD MDM: CPT | Mod: 57

## 2021-01-16 RX ORDER — OXYCODONE AND ACETAMINOPHEN 5; 325 MG/1; MG/1
1 TABLET ORAL ONCE
Refills: 0 | Status: DISCONTINUED | OUTPATIENT
Start: 2021-01-16 | End: 2021-01-16

## 2021-01-16 RX ORDER — TETANUS TOXOID, REDUCED DIPHTHERIA TOXOID AND ACELLULAR PERTUSSIS VACCINE, ADSORBED 5; 2.5; 8; 8; 2.5 [IU]/.5ML; [IU]/.5ML; UG/.5ML; UG/.5ML; UG/.5ML
0.5 SUSPENSION INTRAMUSCULAR ONCE
Refills: 0 | Status: COMPLETED | OUTPATIENT
Start: 2021-01-16 | End: 2021-01-16

## 2021-01-16 RX ADMIN — TETANUS TOXOID, REDUCED DIPHTHERIA TOXOID AND ACELLULAR PERTUSSIS VACCINE, ADSORBED 0.5 MILLILITER(S): 5; 2.5; 8; 8; 2.5 SUSPENSION INTRAMUSCULAR at 06:21

## 2021-01-16 RX ADMIN — OXYCODONE AND ACETAMINOPHEN 1 TABLET(S): 5; 325 TABLET ORAL at 07:30

## 2021-01-16 NOTE — ED PROVIDER NOTE - PHYSICAL EXAMINATION
Physical Exam    Vital Signs: I have reviewed the initial vital signs.  Constitutional: well-nourished, appears stated age, no acute distress  Eyes: Conjunctiva pink, Sclera clear, PERRLA, EOMI.  Musculoskeletal: supple neck, no lower extremity edema, no midline tenderness. TTP of the L dorsal wrist over the radius and ulna with obvious dorsal angulation deformity, swelling and no bruising noted. Limited ROM 2/2 pain, 4/5 strength and no sensory def noted. No pain at forearm, elbow or humerus   Integumentary: warm, dry, no rash  Neurologic: awake, alert, cranial nerves II-XII grossly intact, extremities’ motor and sensory functions grossly intact  Psychiatric: appropriate mood, appropriate affect

## 2021-01-16 NOTE — ED PROVIDER NOTE - CLINICAL SUMMARY MEDICAL DECISION MAKING FREE TEXT BOX
s\p fall at home. Fracture to left wrist. Fracture care. splint. s\p fall at home. Fracture to left wrist. Fracture care. splint. Tdap given.

## 2021-01-16 NOTE — ED PROVIDER NOTE - CARE PROVIDER_API CALL
Min Phelps)  Orthopaedic Surgery  [address] [place] [address]  Phone: [phone]  Fax: [phone]  Follow Up Time: 1-3 Days

## 2021-01-16 NOTE — ED PROVIDER NOTE - PROGRESS NOTE DETAILS
Pt accepted from Dr. Urrutia. Pt reports losing balance and falling onto the left wrist. + deformity. No head injury. On exam neuro intact, AAO3, left wrist deformity. finger traps applied. Abrasion noted to the left arm. Will check Td status. FF: pt is hung with weights, pt given a percocet and denies any pain at this time does not want hematoma block. will reassess and reduce. FF: pt wrist was successfully reduced. placed khoa sling. advised of return precautions discussed at bedside with pt and daughter. f/u with ortho, agreeable to dc.

## 2021-01-16 NOTE — ED PROVIDER NOTE - ATTENDING CONTRIBUTION TO CARE
89yo F with PMHx dementia, DM, HTN, CHF, diverticulitis, gallstones, presents to ED s/p fall with left wrist injury. Patient AAOx2-3 at baseline, able to tell us she fell but unable to describe mechanism. Pt lives with daughter and few grandchildren, here with daughter who states she heard the fall but was unwitnessed. Unknown head trauma, no LOC, not on AC, was quickly found by family after fall. Left wrist pain and deformity, pt denies other pain and offers no other complaints. Denies fever, headache, dizziness, neck pain, CP, SOB, cough, nausea, vomiting, abd pain, back pain. Per daughter, pt has baseline unsteady gait and refuses to use walker (this is also documented in prior charts), is behaving at baseline    Vital signs reviewed  GENERAL: Patient nontoxic appearing, NAD  HEAD: NCAT, no signs of basilar skull fx  EYES: Anicteric  ENT: MMM  NECK: Supple, no midline tenderness  RESPIRATORY: Normal respiratory effort. CTA B/L. No wheezing, rales, rhonchi  CARDIOVASCULAR: Regular rate and rhythm  ABDOMEN: Soft. Nondistended. Nontender.   MUSCULOSKELETAL/EXTREMITIES: Brisk cap refill. Palpable radial pulses. Left wrist swelling, gross deformity, TTP, limited ROM. Able to range left digits. Left shoulder and elbow ROM limited B/L (per daughter is chronic 2/2 arthritis), no TTP of elbow, arm, shoulder, clavicles. No chest wall ttp. No midline back TTP. Able to range hips, knees, ankles without pain.   SKIN:  Left wrist ecchymosis.   NEURO: AAOx2. Moves all extremities. No gross FND. 89yo F with PMHx dementia, DM, HTN, CHF, diverticulitis, gallstones, presents to ED s/p fall with left wrist injury. Patient AAOx2-3 at baseline, able to tell us she fell but unable to describe mechanism. Pt lives with daughter and few grandchildren, here with daughter who states she heard the fall but was unwitnessed. Unknown head trauma, no LOC, not on AC, was quickly found by family after fall. Left wrist pain and deformity, pt denies other pain and offers no other complaints. Denies fever, headache, dizziness, neck pain, CP, SOB, cough, nausea, vomiting, abd pain, back pain. Per daughter, pt has baseline unsteady gait and refuses to use walker (this is also documented in prior charts), is behaving at baseline    Vital signs reviewed  GENERAL: Patient nontoxic appearing, NAD  HEAD: NCAT, no signs of basilar skull fx  EYES: Anicteric  ENT: MMM  NECK: Supple, no midline tenderness  RESPIRATORY: Normal respiratory effort. CTA B/L. No wheezing, rales, rhonchi  CARDIOVASCULAR: Regular rate and rhythm  ABDOMEN: Soft. Nondistended. Nontender.   MUSCULOSKELETAL/EXTREMITIES: Brisk cap refill. Palpable radial pulses. Left wrist swelling, gross deformity, TTP, limited ROM. Able to range left digits. Left shoulder and elbow ROM limited B/L (per daughter is chronic 2/2 arthritis), no TTP of elbow, arm, shoulder, clavicles. No chest wall ttp. No midline back TTP. Able to range hips, knees, ankles without pain.   SKIN:  Left wrist ecchymosis. Skin tear approx 3x3cm over proximal dorsal aspect of forearm.   NEURO: AAOx2. Moves all extremities. No gross FND.

## 2021-01-16 NOTE — ED PROVIDER NOTE - PATIENT PORTAL LINK FT
You can access the FollowMyHealth Patient Portal offered by [facility] by registering at the following website: [email or web address]. By joining [facility]’s FollowMyHealth portal, you will also be able to view your health information using other applications (apps) compatible with our system.

## 2021-01-16 NOTE — ED PROVIDER NOTE - CARE PLAN
Principal Discharge DX:	Closed fracture of left wrist, initial encounter  Secondary Diagnosis:	Fall at home, initial encounter   Principal Discharge DX:	Closed fracture of left wrist, initial encounter  Secondary Diagnosis:	Fall at home, initial encounter  Secondary Diagnosis:	Need for Tdap vaccination

## 2021-01-16 NOTE — ED ADULT NURSE NOTE - NSIMPLEMENTINTERV_GEN_ALL_ED
Implemented All Fall Risk Interventions:  [place] to call system. Call bell, personal items and telephone within reach. Instruct patient to call for assistance. Room bathroom lighting operational. Non-slip footwear when patient is off stretcher. Physically safe environment: no spills, clutter or unnecessary equipment. Stretcher in lowest position, wheels locked, appropriate side rails in place. Provide visual cue, wrist band, yellow gown, etc. Monitor gait and stability. Monitor for mental status changes and reorient to person, place, and time. Review medications for side effects contributing to fall risk. Reinforce activity limits and safety measures with patient and family.

## 2021-01-16 NOTE — ED PROVIDER NOTE - OBJECTIVE STATEMENT
Pt is a 89 year old female with PMH Diverticulitis, Dementia, DM, Glaucoma, HTN, MD presents to ED with complaints for fall. Fall was unwitnessed, found on wood floor, c/o L wrist pain. Pt unsure if struck head. Pt states pain is mild-moderate, throbbing, non radiating with no alleviating or aggravating factors. Pt denies fever, chills, bodyaches, chest pain, sob, abdominal pain, NVCD

## 2021-03-17 ENCOUNTER — NON-APPOINTMENT (OUTPATIENT)
Age: 86
End: 2021-03-17

## 2021-03-17 DIAGNOSIS — E11.9 TYPE 2 DIABETES MELLITUS W/OUT COMPLICATIONS: ICD-10-CM

## 2021-03-17 DIAGNOSIS — R09.02 HYPOXEMIA: ICD-10-CM

## 2021-03-17 DIAGNOSIS — Z87.898 PERSONAL HISTORY OF OTHER SPECIFIED CONDITIONS: ICD-10-CM

## 2021-03-17 DIAGNOSIS — I10 ESSENTIAL (PRIMARY) HYPERTENSION: ICD-10-CM

## 2021-03-17 DIAGNOSIS — R60.0 LOCALIZED EDEMA: ICD-10-CM

## 2021-03-17 PROBLEM — Z00.00 ENCOUNTER FOR PREVENTIVE HEALTH EXAMINATION: Status: ACTIVE | Noted: 2021-03-17

## 2021-03-17 RX ORDER — POTASSIUM CHLORIDE 1500 MG/1
20 TABLET, EXTENDED RELEASE ORAL
Refills: 0 | Status: ACTIVE | COMMUNITY

## 2021-03-17 RX ORDER — PROPRANOLOL HYDROCHLORIDE 40 MG/1
40 TABLET ORAL
Refills: 0 | Status: ACTIVE | COMMUNITY

## 2021-03-17 RX ORDER — PAROXETINE HYDROCHLORIDE 20 MG/1
20 TABLET, FILM COATED ORAL
Refills: 0 | Status: ACTIVE | COMMUNITY

## 2021-03-17 RX ORDER — RANITIDINE HYDROCHLORIDE 150 MG/1
150 TABLET, FILM COATED ORAL
Refills: 0 | Status: ACTIVE | COMMUNITY

## 2021-03-17 RX ORDER — PILOCARPINE HYDROCHLORIDE OPHTHALMIC SOLUTION 20 MG/ML
2 SOLUTION/ DROPS OPHTHALMIC
Refills: 0 | Status: ACTIVE | COMMUNITY

## 2021-03-17 RX ORDER — AMLODIPINE BESYLATE AND BENAZEPRIL HYDROCHLORIDE 5; 10 MG/1; MG/1
5-10 CAPSULE ORAL
Refills: 0 | Status: ACTIVE | COMMUNITY

## 2021-03-17 RX ORDER — DORZOLAMIDE HYDROCHLORIDE AND TIMOLOL MALEATE 20; 5 MG/ML; MG/ML
22.3-6.8 SOLUTION/ DROPS OPHTHALMIC
Refills: 0 | Status: ACTIVE | COMMUNITY

## 2021-03-17 RX ORDER — TRAVOPROST 0.04 MG/ML
0 SOLUTION OPHTHALMIC
Refills: 0 | Status: ACTIVE | COMMUNITY

## 2021-03-17 RX ORDER — FUROSEMIDE 40 MG/1
40 TABLET ORAL
Refills: 0 | Status: ACTIVE | COMMUNITY

## 2021-03-17 RX ORDER — FLUTICASONE PROPIONATE 50 MCG
50 SPRAY, SUSPENSION NASAL
Refills: 0 | Status: ACTIVE | COMMUNITY

## 2021-03-24 NOTE — ED PROVIDER NOTE - MDM ORDERS SUBMITTED SELECTION
Virgilio Lao  Phone: [phone]  Fax: (   )    -  Follow Up Time: 1 week  
Medications/Labs/Imaging Studies

## 2021-03-29 ENCOUNTER — APPOINTMENT (OUTPATIENT)
Dept: PULMONOLOGY | Facility: CLINIC | Age: 86
End: 2021-03-29

## 2021-03-30 NOTE — ED PROVIDER NOTE - NSFOLLOWUPINSTRUCTIONS_ED_ALL_ED_FT
General
Wrist Fracture Treated With Immobilization  A wrist fracture is a break or crack in one of the bones of your wrist. Your wrist is made up of eight small bones at the palm of your hand (carpal bones) and two long bones that make up your forearm (radius and ulna).    If the joint is stable and the bones are still in their normal position (nondisplaced), the injury may be treated with immobilization. This involves the use of a cast, splint, or sling to hold your arm in place. Immobilization ensures that your bones continue to stay in the correct position while your arm is healing.    What are the causes?  This condition may be caused by:    A direct force to the wrist.  Falling on an outstretched hand.  Trauma, such as a car accident or a fall.    What increases the risk?  This condition is more likely to develop in people who:    Do contact and high-risk sports, such as skiing, biking, and ice skating.  Take steroid medicines.  Smoke.  Are female.  Are .  Drink more than three alcoholic beverages per day.  Have low or lowered bone density (osteoporosis or osteopenia).  Are older.  Have a history of previous fractures.    What are the signs or symptoms?  Symptoms of this condition include:    Pain.  Swelling.  Bruising.  Not being able to move the wrist normally.    Additionally, the wrist may hang in an odd position or appear deformed.    How is this diagnosed?  This condition may be diagnosed based on a physical exam and X-rays. You may also have a CT scan or MRI.    How is this treated?  Treatment for this condition involves wearing a cast or splint until the injured area is stable enough for you to begin range-of-motion exercises. You also may be given a sling. You may also be prescribed pain medicine.    Follow these instructions at home:  If you have a splint:     Wear the splint as told by your health care provider. Remove it only as told by your health care provider.  Loosen the splint if your fingers tingle, become numb, or turn cold and blue.  Do not let your splint get wet if it is not waterproof.  Keep the splint clean.  If you have a sling:     Wear it as told by your health care provider. Remove it only as told by your health care provider.  If you have a cast:     Do not stick anything inside the cast to scratch your skin. Doing that increases your risk of infection.  Check the skin around the cast every day. Report any concerns to your health care provider. You may put lotion on dry skin around the edges of the cast. Do not apply lotion to the skin underneath the cast.  Do not let your cast get wet if it is not waterproof.  Keep the cast clean.  Bathing     Do not take baths, swim, or use a hot tub until your health care provider approves. Ask your health care provider if you can take showers. You may only be allowed to take sponge baths for bathing.  If your cast or splint is not waterproof, cover it with a watertight plastic bag when you take a bath or a shower.  If you have a sling, remove it for bathing only if your health care provider tells you that it is safe to do that.  Managing pain, stiffness, and swelling     If directed, apply ice to the injured area.    Put ice in a plastic bag.  Place a towel between your skin and the bag.  Leave the ice on for 20 minutes, 2–3 times per day.    Move your fingers often to avoid stiffness and to lessen swelling.  Raise (elevate) the injured area above the level of your heart while you are sitting or lying down.  Driving     Do not drive or operate heavy machinery while taking prescription pain medicine.  Ask your health care provider when it is safe to drive if you have a cast, splint, or sling on your wrist.  Activity     Return to your normal activities as told by your health care provider. Ask your health care provider what activities are safe for you.  Do range-of-motion exercises only as told by your health care provider or physical therapist.  General instructions     Do not put pressure on any part of the cast or splint until it is fully hardened. This may take several hours.  Do not use any tobacco products, such as cigarettes, chewing tobacco, and e-cigarettes. Tobacco can delay bone healing. If you need help quitting, ask your health care provider.  Take over-the-counter and prescription medicines only as told by your health care provider.  Keep all follow-up visits as told by your health care provider. This is important.  Contact a health care provider if:  Your cast, splint, or sling is damaged or loose.  You have any new pain, swelling, or bruising.  Your pain, swelling, and bruising do not improve.  You have a fever.  You have chills.  Get help right away if:  Your skin or fingers on your injured arm turn blue or gray.  Your arm feels cold or gets numb.  You have severe pain in your injured wrist.  This information is not intended to replace advice given to you by your health care provider. Make sure you discuss any questions you have with your health care provider.

## 2022-01-26 NOTE — ED PROVIDER NOTE - NS ED SCRIBE STATEMENT
[0] : 2) Feeling down, depressed, or hopeless: Not at all (0) [PHQ-2 Negative - No further assessment needed] : PHQ-2 Negative - No further assessment needed [[id number]] : Zero Attending

## 2022-06-14 NOTE — ED ADULT NURSE NOTE - NSFALLRSKASSESASSIST_ED_ALL_ED
Bed in lowest position, wheels locked, appropriate side rails in place/Call bell, personal items and telephone in reach/Instruct patient to call for assistance before getting out of bed or chair/Non-slip footwear when patient is out of bed/[place] to call system/Physically safe environment - no spills, clutter or unnecessary equipment/Purposeful Proactive Rounding/Room/bathroom lighting operational, light cord in reach yes

## 2022-09-15 NOTE — ED ADULT NURSE NOTE - CAS DISCH CONDITION
[facility] INPATIENT ENCOUNTER  PHYSICAL MEDICINE AND REHABILITATION  DAILY PROGRESS NOTE    ADMISSION DATE:  9/6/2022  DATE:  9/15/2022  CURRENT HOSPITAL DAY:  Hospital Day: 10  ATTENDING PHYSICIAN:  Loretta Cabrera MD  CODE STATUS:  Full Resuscitation    CHIEF COMPLAINT:  Chronic cerebrovascular accident (CVA)    INTERVAL HISTORY:    Ludin Evans is a 62 year old male patient with past medical history of hypertension, HLD and kidney stone admitted with Chronic cerebrovascular accident (CVA) [I69.30] , near occlusion of R M1 MCA segment s/p vessel recanalization on 8/30 with residual dysarthria and left-sided hemiparesis.       09/15 patient seen and examined in AM lying in bed. Said he could not sleep last night because he wanted to urinate but had difficulty doing so. Had bladder scan done for possible urinary retention, showed  Urinated in diaper in early AM. Is frustrated that he could not urinate but feels the urge to. Was not having urinary issues at home. Given bedside urinal to try and urinate. Was wearing diaper.     Team Conference:  PT-penny ALLEN, needs 24hr ass upon discharge, walk mod 40ft  OT- attention is poor impairs safety, motivated and works hard. Eating set up grooming supervision. Bathing mod A, UE dressing min, lower body dressing max, toileting max, toilet transfer  Nursing: doing well, checking sugar dec to twice a day, bruising on abdomen , disinhibited, male nurses only       Speech: comprehension supervision, attn min, problem solving, safety awareness mod    SW; stay with son, daughter-in-law doesn't work, son has 10 stairs to enter, interior stairs, need to set up family training.   Discharge, 9/30 Friday. Encourage more family visits.     MEDICATIONS:    The medication list was reviewed today.        HISTORIES:     I have reviewed the past medical history, family history, social history, medications and allergies listed in the medical record as obtained by my nursing staff and support  staff and agree with their documentation.      REVIEW OF SYSTEMS:  Review of Systems   Constitutional: Negative for malaise/fatigue.   Eyes: Negative for pain.   Respiratory: Negative for cough, hemoptysis and shortness of breath.    Cardiovascular: Negative for chest pain.   Gastrointestinal: Negative for abdominal pain, nausea and vomiting.        Stool Amount: Large (09/13/22 1038)  Stool Occurrence:  (pt is incontinent at this time) (09/14/22 0657)        Genitourinary: Negative for dysuria, frequency and urgency.   Musculoskeletal: Negative for back pain, joint pain and neck pain.   Neurological: Positive for dizziness, focal weakness and weakness.        Left side weak. Denies dizziness today.      Functional Status   Bed Mobility   Bed Mobility  Rolling to the Right: Supervision (Supv)  Rolling to the Left: Touching/Steadying Assistance  Supine to Sit: Moderate Assist (Mod)  Sit to Supine: Minimal Assist (Min)  Bed Mobility Comments: MOD A when going supine to sit from the L side, MIN A when going supine to sit from R side     Transfers  Transfers  Sit to Stand: Minimal Assist (Min)  Stand to Sit: Minimal Assist (Min)  Stand Pivot Transfers: Minimal Assist (Min)  Assistive Device/: LBQC    Gait  Gait  Gait Assistance: Moderate Assist (Mod)  Assistive Device/: LBQC  Ambulation Distance (Feet): 30 Feet  Gait Comments 1: L solid afo, step to gait pattern, decreased L knee stability, leans forward too far  Gait Comments 2: frequent verbal cues for safety and sequencing  Gait Comments 3: x2  Second Trial: Yes, Gait - Second Trial  Gait Assistance: Moderate Assist (Mod)  Assistive Device/: LBQC  Ambulation Distance (Feet): 40 Feet  Gait Comments 1: same as above    Stairs       Wheelchair Mobility       Balance  Balance  Balance Comments #1: step taps R LE with forced WBing left LE  Balance Comments #2: step up on 4\" block with R UE support, MIN A, verbal cues for  sequencing    Activities of Daily Living   Self Cares/ADL's  Upper Body Dressing Assistance: Minimal Assist (Min)  Upper Body Dressing Deficit: Pull down in back, Verbal cueing, Supervision/Safety  Lower Body Clothing Assistance: Maximal Assist (Max)  Footwear Assistance: Maximal Assist (Max)  Lower Body Dressing Deficit: Tie shoes, Don/doff R sock, Don/doff L sock, Don/doff L shoe, Pull up over hips, Thread LLE into pants  Toileting Assistance: Maximal Assist (Max)  Toileting Deficit: Perineal hygiene, Clothing management up     Household mobility  Household Mobility  Sit to Stand: Minimal Assist (Min)  Stand to Sit: Minimal Assist (Min)  Toilet Transfers: Partial/Moderate Assistance  Transfer Equipment: wall grab bar, commode over toilet  Standing - Static: Minimal Assist (Min)  Standing - Dynamic: Partial/Moderate Assistance     Home Management       Tolerance  OT Activity Tolerance  Activity Tolerance: 1:1 Activity to rest    Cognition  Communication/Cognition  Overall Cognitive Status: Impaired  Attention: Attends with cues to redirect, Impaired sustained attention (verbose, distractable)  Executive Functions: Impaired planning, Impaired self monitoring/self awareness  Memory: Decreased short term memory  Safety Judgement: Decreased awareness of need for assistance, Decreased awareness of need for safety  Awareness of Deficits: Decreased awareness of deficits  Problem Solving: Assistance required to identify errors made, Assistance required to generate solutions        OBJECTIVE:    VITAL SIGNS:     Vital Last Value 24 Hour Range   Temperature 97.9 °F (36.6 °C) (09/15/22 0004) Temp  Min: 97.7 °F (36.5 °C)  Max: 98.2 °F (36.8 °C)   Pulse 70 (09/15/22 0004) Pulse  Min: 69  Max: 82   Respiratory 16 (09/15/22 0004) Resp  Min: 16  Max: 18   Non-Invasive  Blood Pressure 114/68 (09/15/22 0004) BP  Min: 102/62  Max: 131/85   Pulse Oximetry 95 % (09/15/22 0004) SpO2  Min: 95 %  Max: 97 %     Vital Today Admitted    Weight 85.5 kg (09/14/22 0715) Weight: 87.5 kg (09/06/22 1033)       INTAKE/OUTPUT:      Intake/Output Summary (Last 24 hours) at 9/15/2022 0929  Last data filed at 9/14/2022 1707  Gross per 24 hour   Intake 240 ml   Output 450 ml   Net -210 ml       Bowel: Stool Amount: Large (09/13/22 1038)  Stool Occurrence:  (pt is incontinent at this time) (09/14/22 0657)     PVR: Bladder Scan  Reason for Scan: Absence of voiding (09/15/22 0100)  Bladder Scanned Volume (mL): 157 mL (09/15/22 0100)    PHYSICAL EXAMINATION:  Physical Exam  Constitutional:       General: He is not in acute distress.     Appearance: Normal appearance. He is obese. He is not ill-appearing, toxic-appearing or diaphoretic.   HENT:      Head: Normocephalic and atraumatic.      Right Ear: External ear normal.      Left Ear: External ear normal.      Nose: Nose normal. No congestion or rhinorrhea.      Mouth/Throat:      Mouth: Mucous membranes are moist.      Pharynx: Oropharynx is clear. No oropharyngeal exudate or posterior oropharyngeal erythema.      Neck: Normal range of motion and neck supple. No rigidity or tenderness.   Eyes:      General:         Right eye: No discharge.         Left eye: No discharge.      Conjunctiva/sclera: Conjunctivae normal.      Comments: Favors looking toward right.   Pulmonary:      Effort: Pulmonary effort is normal. No respiratory distress.      Breath sounds: No stridor. No wheezing or rhonchi.   Abdominal:      General: Abdomen is flat. There is no distension.      Palpations: Abdomen is soft.      Tenderness: There is no abdominal tenderness.      Comments: Tender to palpation near heparin injection sites.   Musculoskeletal:         General: No swelling, deformity or signs of injury.      Right lower leg: No edema.      Left lower leg: No edema.   Skin:     General: Skin is warm.   Neurological:      General: No focal deficit present.      Mental Status: He is alert.      Motor: Weakness present.      Gait: Gait  abnormal.      Comments: Slight L sided facial droop, Right UE and LE 5/5. Left side hemiparesis UE and LE, L UE is 3/5, L LE is 4-/5,not able to hold against resistance. 2+/5  strength. More than trace PF, DF. Sensation intact throughout.    Psychiatric:         Mood and Affect: Mood normal.      Comments: Abnormal thought content, reduced thought filtration. Emphatic spontaneous speech. Verbose. Excitable. Able to follow commands consistently. Cooperative. Insight: limited-fair. Judgement: limited.           LABORATORY DATA:    Recent Labs   Lab 09/14/22 0918   WBC 8.9   RBC 4.79   HGB 14.9   HCT 42.3   MCV 88.3   MCH 31.1   MCHC 35.2   RDWCV 12.4      TLYMPH 19       Recent Labs   Lab 09/14/22 0918   SODIUM 135   CHLORIDE 103   BUN 20   BCRAT 29*   POTASSIUM 4.4   GLUCOSE 173*   CREATININE 0.68   CALCIUM 9.5       Recent Labs   Lab 09/15/22  0740 09/14/22  1651 09/14/22  0737 09/13/22  1650 09/13/22  0739 09/12/22  1634 09/12/22  0734 09/11/22  1654   GLUB 130* 101* 130* 122* 148* 171* 168* 179*         IMAGING STUDIES:   CT HEAD WO CONTRAST  8/31/2022  Order: [id number]    Impression    IMPRESSION:     No evidence of hemorrhage. Areas of probable ischemia involving the right M1 territory.           MRI BRAIN WO CONTRAST 8/31/2022  Order: [id number]  Impression    IMPRESSION:       Restricted diffusion consistent with acute ischemia right medial temporal lobe area parahippocampal   region and caudate tail region, right internal capsule posterior limb,  right cerebellum and occipital   lobe.     Parenchymal atrophy and chronic ischemic changes as described.           ASSESSMENT/PLAN:     Dysarthria as late effect of cerebrovascular accident (CVA)  Assessment & Plan  9/14 On going Speech therapy, still with acute communication and cog deficits.  Impaired mobility and ADLs  Assessment & Plan   9/14 therapies with OT and PT as tolerated. Exercise fall precautions.    Hemiparesis affecting left side as  late effect of stroke (CMS/HCC)  Assessment & Plan  9/14 ongoing therapies with OT and PT as tolerated. Left UE and LE improving in strength, more proximal than distal 3/5 UE, 4-/5 LE at knee but trace at DF.   strength improving 2/5+ from trace    Constipation  9/14- started on Miralax scheduled daily. Reports having regular BM at home (daily) . Currently denies abdominal pain. Eating well.   Stool Amount: Large (09/13/22 1038)  Stool Occurrence:  (pt is incontinent at this time) (09/14/22 0657)         Elevated hemoglobin A1c  Assessment & Plan  HGB A1C 6.8, newly diagnoted D2M  Blood sugar monitor  BID  Sliding scale coverage  9/14 blood levels stable, last measured 130. metformin 500 BID. Diabetic educator consult. On consistent carb mod diet.  Recent Labs   Lab 09/15/22  0740 09/14/22  1651 09/14/22  0737 09/13/22  1650 09/13/22  0739 09/12/22  1634 09/12/22  0734 09/11/22  1654   GLUB 130* 101* 130* 122* 148* 171* 168* 179*       DVT prophylaxis  Assessment & Plan  Heparing sq q 8 hours  SCD  TEDS hoses    Back pain  Assessment & Plan  Lidocaine patch to lower back  Acetaminophen for mild pain  9/14 denies pain today, no PRN pain meds used today.    Dysphagia-Resolved  Assessment & Plan  [facility] soft diet  Speech therapy  Monitor oral intake  9/14 tolerating consistent carb mod diet . Labs today wnl.   Recent Labs   Lab 09/14/22  0918   SODIUM 135   CHLORIDE 103   BUN 20   BCRAT 29*   POTASSIUM 4.4   GLUCOSE 173*   CREATININE 0.68   CALCIUM 9.5       HLD (hyperlipidemia)  Assessment & Plan  Lipid abnormalities are unchanged.  Pharmacotherapy as ordered.  Lipids will be reassessed as needed.  Lipitor 40 daily, Hospitalist following      HTN (hypertension)  Assessment & Plan  BP  Min: 102/62  Max: 131/85  Orthostatics: 139/74, 147/81, 126/80 supine, sitting, standing  Pt prn hydralazine for SBP >180.  9/14 no dizziness or headache today, takes meclizine before starting therapy in AM. On lisinopril 5    CVA  (cerebral vascular accident) (CMS/[facility])  Assessment & Plan  PT eval and treat  OTeval and treat  Speech  Full dose asa  14-day event cardiac monitor was placed at [facility].  He was admitted from 8/30/2022 until 9/6/2022. Cardiac monitor discont over the weekend. 9/14 Denies chest pain, new weakness      DVT prophylaxis SCDs, TEDs, Heparin CPM and monitor    Principal Problem:    Chronic cerebrovascular accident (CVA)  Active Problems:    CVA (cerebral vascular accident) (CMS/HCC)    HTN (hypertension)    HLD (hyperlipidemia)    Dysphagia    Back pain    DVT prophylaxis    Type 2 diabetes mellitus without complication, without long-term current use of insulin (CMS/[facility])    Hemiparesis affecting left side as late effect of stroke (CMS/[facility])    Impaired mobility and ADLs    Dysarthria as late effect of cerebrovascular accident (CVA)    Benign prostatic hyperplasia without lower urinary tract symptoms    Gastroesophageal reflux disease without esophagitis       Patient's current functional progress and patient's current medical status and adjustment of treatment plan based on the assessment: Face-to-face: Physiatry oversight 9/15  Patient is participating well in therapy. No recent complaints of dizziness or headache. Taking Meclizine PRN in AM prior to therapy, has helped with participation in therapy today. On lisinopril to 5mg daily, tolerating well .Started daily orthostatics, wnl. Continue therapy as tolerated, focusing on transfers and ambulation. Pain is adequately managed with lidocaine patch PRN and tylenol. No active complaints. Added miralax scheduled daily to regulate BM. Denies abdominal pain. Been urinating spontaneously.  Recent labs 9/14 wnl. Patient on general diet, carb mod. Continue therapies as tolerated. Blood sugars stable, on metformin 500 BID, put on mod carb diet, last hemoglobin A1c was 6.8. CTM. Able to walk 30 ft with LBQC. Min A with sit to stand, stand to sit. Mod  assist with gait.    Patient conferenced with team today.  He is making improvement with his strength.  He still has decreased attention and is requiring physical, occupational and speech therapy.  He has good blood pressure and is walking now 30 feet with moderate assistance with a large-base quad cane.  This is improving with regards to distance as well as the amount help needed.  No new labs today.  Anticipate discharge in a little more than 2 weeks.   discussed with patient's son.  He still needing significant assistance and family will come in for education but I do anticipate this will continue to improve over the next couple of weeks.    A student , Binta Clemons, assisted with documenting this service.  I saw the patient and reviewed and verified all information documented by the student and made modifications to such information, when appropriate.   Stable

## 2022-11-03 NOTE — ED PROVIDER NOTE - CLINICAL SUMMARY MEDICAL DECISION MAKING FREE TEXT BOX
Undermining Type: Entire Wound Patient presnts for evaluation of right rib pain that is posterior after undergoing the hiemlich maneuver on mon 48 hours prior to arrival.  She denies any other pain she denies any fevers or chill she denies any shortness of breath.  we obtained ct which reveals no acute fractures at this time

## 2023-07-14 NOTE — PROGRESS NOTE ADULT - ASSESSMENT
Caller: Jagdish Rea    Relationship to patient: Self    Best call back number: [phone]    Patient is needing: SCHEDULE LUMBAR INJECTION      89 year old female with PMH dementia, DM, Diverticulosis, HTN, DM, HFpEF, ch resp failure on 24/7 2L NC admitted for confusion.    # Altered Mental Status  - secondary to infection vs Progression of Dementia vs polypharmacy   - hemodynamically stable   - blood work unremarkable; UA negative for infections, no signs of sepsis   - CXR: no evidence of acute cardiopul disease; (+) small left pleural effusion  - treated for UTI 4 days ago and discharged from ER on Vantin  - c/w vantin for 5 more day to complete abx course  - ambulate as tolerated  - fall precaution   - PT on consult   - Neurology on consult : rEEG ordered     # Chronic HFpEF  - euvolemic on exam   - ECHO (04/2019): normal systolic function and normal EF, with mild TR and MR  - c/w Lasix and propanolol     # Chronic hypoxemic respiratory failure   - on home O2 (24/7)    # HTN  - c/w Amlodipine, Lisinopril and lasix     # DM  - monitor FS  - start lispro 3U+3U+3U (sliding scale)    # DVT prophylaxis  - start heparin    # DASH diet, CHO consistent     # DNR/DNI  - healthcare proxy: Teressa (daughter): [phone].     #Progress Note Handoff:  Pending (specify):  rEEG   Family discussion: d/w patient and family  Disposition: Home___/SNF___/Other________/Unknown at this time__x______      Shannen Ren, DO

## 2024-05-21 NOTE — ED ADULT TRIAGE NOTE - MEANS OF ARRIVAL
unknown stretcher Additional Notes: Pt spends most time sitting down. Render Risk Assessment In Note?: no Detail Level: Simple

## 2025-02-26 NOTE — ED PROVIDER NOTE - NSDCPRINTRESULTS_ED_ALL_ED
STRETCHING EXERCISES:    All stretches should be performed AT LEAST 3 times a day, holding EACH stretch for 30 seconds.   Failing to do so will likely result in an ineffective stretch.   Do not keep performing exercises if they increase your pain.   If you fail to improve, follow up with your podiatrist.    1.)      Sit on a hard surface with your injured leg stretched out in front of you. Loop a towel around your toes and the ball of your foot and pull the towel toward your body keeping your leg straight. Hold this position for 30 seconds and then relax. Repeat 3 times.    2/3.)        Sit in a chair and cross the painful foot over the knee of your other leg. Place your fingers over the base of your toes and pull them back toward your shin until you feel a comfortable stretch in the arch of your foot. Hold 30 seconds. Follow up with massage of the area.   Roll your painful foot back and forth from your heel to your mid-arch over a frozen water bottle. Repeat for 3- 5 minutes.    4.)       Stand with the ball of one foot on a stair. Reach for the step below with your heel until you feel a stretch in the arch of your foot. Hold this position for 30 seconds and then relax. Repeat 3 times.    Alternative to those with poor balance:   Find a wall or door entryway, use hands to balance against the structure. Dorsiflex foot against the wall and lean into the wall or door frame to stretch the posterior calf muscles.    5.)         Stand facing a wall with your hands on the wall at about eye level. Keep your injured leg back with your heel on the floor. Keep the other leg forward with the knee bent. Turn your back foot slightly inward (as if you were pigeon-toed). Slowly lean into the wall until you feel a stretch in the back of your calf. Hold the stretch for 30 seconds. Return to the starting position. Repeat 3 times.       
Patient requests all Lab and Radiology Results on their Discharge Instructions